# Patient Record
Sex: MALE | Race: AMERICAN INDIAN OR ALASKA NATIVE | Employment: FULL TIME | URBAN - METROPOLITAN AREA
[De-identification: names, ages, dates, MRNs, and addresses within clinical notes are randomized per-mention and may not be internally consistent; named-entity substitution may affect disease eponyms.]

---

## 2017-04-21 ENCOUNTER — OFFICE VISIT (OUTPATIENT)
Dept: INTERNAL MEDICINE CLINIC | Age: 52
End: 2017-04-21

## 2017-04-21 VITALS
SYSTOLIC BLOOD PRESSURE: 130 MMHG | TEMPERATURE: 98.5 F | DIASTOLIC BLOOD PRESSURE: 80 MMHG | RESPIRATION RATE: 18 BRPM | BODY MASS INDEX: 23.52 KG/M2 | HEART RATE: 86 BPM | WEIGHT: 183.25 LBS | HEIGHT: 74 IN | OXYGEN SATURATION: 98 %

## 2017-04-21 DIAGNOSIS — A69.20 ECM (ERYTHEMA CHRONICUM MIGRANS): Primary | ICD-10-CM

## 2017-04-21 RX ORDER — DOXYCYCLINE 100 MG/1
100 TABLET ORAL 2 TIMES DAILY
Qty: 20 TAB | Refills: 0 | Status: SHIPPED | OUTPATIENT
Start: 2017-04-21 | End: 2017-12-21 | Stop reason: ALTCHOICE

## 2017-04-21 NOTE — PROGRESS NOTES
HISTORY OF PRESENT ILLNESS  Daniel Jaramillo is a 47 y.o. male. HPI  Problem visit:  Daniel Jaramillo is here for complaint of insect bite. Problem began 3 week(s) ago. Severity is mild  Character of problem: round, red rash behind R knee, improving gradually, not pruritic. Has central increased redness. nothing makes the problem worse. nothing makes the problem better. Associated symptoms include: no witnessed insect/ tick,   Treatments tried include: emollient lotion helping some      ROS    Physical Exam   Musculoskeletal:        Legs:  R popliteal area - Slightly rounded but irregular faint erythematous patch - 4 cm at widest with suggestion of irregular central clearing. 3-4 scattered darker based vesicles centrally. No obvious puncture. No FB's attached. No swelling. Visit Vitals    /80 (BP 1 Location: Left arm, BP Patient Position: Sitting)    Pulse 86    Temp 98.5 °F (36.9 °C) (Oral)    Resp 18    Ht 6' 1.5\" (1.867 m)    Wt 183 lb 4 oz (83.1 kg)    SpO2 98%    BMI 23.85 kg/m2         ASSESSMENT and PLAN  Ara Kim was seen today for insect bite. Diagnoses and all orders for this visit:    ECM (erythema chronicum migrans) - vs tinea corporis? Can't exclude early Lyme. Will treat empirically for 10 days with doxycycline. Daniel Jaramillo was counseled on this new medication prescribed. Adverse effects, risks and monitoring of medication along with potential benefits of medication were discussed. All of his questions about the medication were answered. Will also cover tinea with lamisil or lotrimin cream bid .  -     doxycycline (ADOXA) 100 mg tablet; Take 1 Tab by mouth two (2) times a day. Follow-up Disposition:  Return if symptoms worsen or fail to improve.

## 2017-04-21 NOTE — MR AVS SNAPSHOT
Visit Information Date & Time Provider Department Dept. Phone Encounter #  
 4/21/2017  4:00 PM Renetta Trejo MD [facility] [address] [id number] Follow-up Instructions Return if symptoms worsen or fail to improve. Upcoming Health Maintenance Date Due INFLUENZA AGE 9 TO ADULT 8/1/2016 COLONOSCOPY 11/9/2020 DTaP/Tdap/Td series (2 - Td) 12/20/2026 Allergies as of 4/21/2017  Review Complete On: 4/21/2017 By: La Nena Anderson LPN Severity Noted Reaction Type Reactions Codeine  08/23/2010   Side Effect Other (comments) Had halluanations, but no real allergy Current Immunizations  Reviewed on 12/20/2016 Name Date Influenza Vaccine 10/21/2013 Influenza Vaccine PF 9/29/2014 Influenza Vaccine Split 10/29/2012, 10/18/2011, 10/4/2010 TD Vaccine 10/4/2008 Tdap 12/20/2016 Not reviewed this visit You Were Diagnosed With   
  
 Codes Comments ECM (erythema chronicum migrans)    -  Primary ICD-10-CM: D51.94 ICD-9-CM: 088.81 Vitals BP Pulse Temp Resp Height(growth percentile) Weight(growth percentile) 130/80 (BP 1 Location: Left arm, BP Patient Position: Sitting) 86 98.5 °F (36.9 °C) (Oral) 18 6' 1.5\" (1.867 m) 183 lb 4 oz (83.1 kg) SpO2 BMI Smoking Status 98% 23.85 kg/m2 Never Smoker Vitals History BMI and BSA Data Body Mass Index Body Surface Area  
 23.85 kg/m 2 2.08 m 2 Preferred Pharmacy Pharmacy Name Phone [id number] [address], [address] [phone] Your Updated Medication List  
  
   
This list is accurate as of: 4/21/17  4:39 PM.  Always use your most recent med list.  
  
  
  
  
 ALEVE 220 mg tablet Generic drug:  naproxen sodium Take 220 mg by mouth as needed. CLARITIN 10 mg tablet Generic drug:  loratadine Take 10 mg by mouth daily. doxycycline 100 mg tablet Commonly known as:  ADOXA Take 1 Tab by mouth two (2) times a day. FISH OIL 1,000 mg Cap Generic drug:  omega-3 fatty acids-vitamin e Take 1 Cap by mouth. fluticasone 50 mcg/actuation nasal spray Commonly known as:  Cherelle Gutierrez INHALE 2 SPRAYS IN EACH NOSTRIL ONCE DAILY AS DIRECTED  
  
 multivitamin, stress formula tablet Commonly known as:  STRESS TAB Take 1 Tab by mouth daily. terbinafine HCl 1 % topical cream  
Commonly known as:  LAMISIL Apply  to affected area two (2) times a day. Prescriptions Sent to Pharmacy Refills  
 doxycycline (ADOXA) 100 mg tablet 0 Sig: Take 1 Tab by mouth two (2) times a day. Class: Normal  
 Pharmacy: [address] [place] [address] Ph #: [phone] Route: Oral  
  
Follow-up Instructions Return if symptoms worsen or fail to improve. Introducing [facility] & HEALTH SERVICES! [place] [facility] introduces [employer] patient portal. Now you can access parts of your medical record, email your doctor's office, and request medication refills online. 1. In your internet browser, go to [email or web address] 2. Click on the First Time User? Click Here link in the Sign In box. You will see the New Member Sign Up page. 3. Enter your Voter [employer]ity Access Code exactly as it appears below. You will not need to use this code after youve completed the sign-up process. If you do not sign up before the expiration date, you must request a new code. · V[employer] Access Code: [email or web address] Expires: 7/20/2017  4:39 PM 
 
4. Enter the last four digits of your Social Security Number (xxxx) and Date of Birth (mm/dd/yyyy) as indicated and click Submit. You will be taken to the next sign-up page. 5. Create a Beacon Powert ID. This will be your Beacon Pow[employer] login ID and cannot be changed, so think of one that is secure and easy to remember. 6. Create a Be[employer] Pow[employer] password. You can change your password at any time. 7. Enter your Password Reset Question and Answer. This can be used at a later time if you forget your password. 8. Enter your e-mail address. You will receive e-mail notification when new information is available in [address] 9. Click Sign Up. You can now view and download portions of your medical record. 10. Click the Download Summary menu link to download a portable copy of your medical information. If you have questions, please visit the Frequently Asked Questions section of the [employer] website. Remember, [employer] is NOT to be used for urgent needs. For medical emergencies, dial 911. Now available from your iPhone and Android! Please provide this summary of care documentation to your next provider. Your primary care clinician is listed as Raghu Maradiaga. If you have any questions after today's visit, please call [phone].

## 2017-12-21 ENCOUNTER — OFFICE VISIT (OUTPATIENT)
Dept: INTERNAL MEDICINE CLINIC | Age: 52
End: 2017-12-21

## 2017-12-21 VITALS
SYSTOLIC BLOOD PRESSURE: 134 MMHG | RESPIRATION RATE: 18 BRPM | DIASTOLIC BLOOD PRESSURE: 77 MMHG | WEIGHT: 185 LBS | BODY MASS INDEX: 23.74 KG/M2 | HEART RATE: 64 BPM | OXYGEN SATURATION: 100 % | TEMPERATURE: 98.3 F | HEIGHT: 74 IN

## 2017-12-21 DIAGNOSIS — Z00.00 PREVENTATIVE HEALTH CARE: Primary | ICD-10-CM

## 2017-12-21 DIAGNOSIS — M25.562 CHRONIC PAIN OF LEFT KNEE: ICD-10-CM

## 2017-12-21 DIAGNOSIS — G89.29 CHRONIC PAIN OF LEFT KNEE: ICD-10-CM

## 2017-12-21 NOTE — MR AVS SNAPSHOT
Visit Information Date & Time Provider Department Dept. Phone Encounter #  
 12/21/2017 10:15 AM Padma Luque MD [facility] [address] [id number] Follow-up Instructions Return in about 1 year (around 12/21/2018). Upcoming Health Maintenance Date Due Influenza Age 5 to Adult 8/1/2017 COLONOSCOPY 11/9/2020 DTaP/Tdap/Td series (2 - Td) 12/20/2026 Allergies as of 12/21/2017  Review Complete On: 12/21/2017 By: Padma Luque MD  
  
 Severity Noted Reaction Type Reactions Codeine  08/23/2010   Side Effect Other (comments) Had halluanations, but no real allergy Current Immunizations  Reviewed on 12/21/2017 Name Date Influenza Vaccine 11/29/2017, 10/21/2013 Influenza Vaccine PF 9/29/2014 Influenza Vaccine Split 10/29/2012, 10/18/2011, 10/4/2010 TD Vaccine 10/4/2008 Tdap 12/20/2016 Reviewed by Padma Luque MD on 12/21/2017 at 10:48 AM  
You Were Diagnosed With   
  
 Codes Comments Preventative health care    -  Primary ICD-10-CM: Z00.00 ICD-9-CM: V70.0 Chronic pain of left knee     ICD-10-CM: M25.562, G89.29 ICD-9-CM: 719.46, 338.29 Vitals BP Pulse Temp Resp Height(growth percentile) Weight(growth percentile) 134/77 (BP 1 Location: Left arm, BP Patient Position: Sitting) 64 98.3 °F (36.8 °C) (Oral) 18 6' 1.5\" (1.867 m) 185 lb (83.9 kg) SpO2 BMI Smoking Status 100% 24.08 kg/m2 Never Smoker Vitals History BMI and BSA Data Body Mass Index Body Surface Area 24.08 kg/m 2 2.09 m 2 Preferred Pharmacy Pharmacy Name Phone [id number] [phone] [facility], [address] [phone] Your Updated Medication List  
  
   
This list is accurate as of: 12/21/17 10:59 AM.  Always use your most recent med list.  
  
  
  
  
 ALEVE 220 mg tablet Generic drug:  naproxen sodium Take 220 mg by mouth as needed. CLARITIN 10 mg tablet Generic drug:  loratadine Take 10 mg by mouth daily. FISH OIL 1,000 mg Cap Generic drug:  omega-3 fatty acids-vitamin e Take 1 Cap by mouth. fluticasone 50 mcg/actuation nasal spray Commonly known as:  Amanda yer INHALE 2 SPRAYS IN EACH NOSTRIL ONCE DAILY AS DIRECTED  
  
 multivitamin, stress formula tablet Commonly known as:  STRESS TAB Take 1 Tab by mouth daily. We Performed the Following LIPID PANEL [57967 CPT(R)] METABOLIC PANEL, BASIC [95220 CPT(R)] PSA, DIAGNOSTIC (PROSTATE SPECIFIC AG) [id number] CPT(R)] Follow-up Instructions Return in about 1 year (around 12/21/2018). Patient Instructions Well Visit, Men 48 to 72: Care Instructions Your Care Instructions Physical exams can help you stay healthy. Your doctor has checked your overall health and may have suggested ways to take good care of yourself. He or she also may have recommended tests. At home, you can help prevent illness with healthy eating, regular exercise, and other steps. Follow-up care is a key part of your treatment and safety. Be sure to make and go to all appointments, and call your doctor if you are having problems. It's also a good idea to know your test results and keep a list of the medicines you take. How can you care for yourself at home? · Reach and stay at a healthy weight. This will lower your risk for many problems, such as obesity, diabetes, heart disease, and high blood pressure. · Get at least 30 minutes of exercise on most days of the week. Walking is a good choice. You also may want to do other activities, such as running, swimming, cycling, or playing tennis or team sports. · Do not smoke. Smoking can make health problems worse. If you need help quitting, talk to your doctor about stop-smoking programs and medicines. These can increase your chances of quitting for good. · Protect your skin from too much sun. When you're outdoors from 10 a.m. to 4 p.m., stay in the shade or cover up with clothing and a hat with a wide brim. Wear sunglasses that block UV rays. Even when it's cloudy, put broad-spectrum sunscreen (SPF 30 or higher) on any exposed skin. · See a dentist one or two times a year for checkups and to have your teeth cleaned. · Wear a seat belt in the car. · Limit alcohol to 2 drinks a day. Too much alcohol can cause health problems. Follow your doctor's advice about when to have certain tests. These tests can spot problems early. · Cholesterol. Your doctor will tell you how often to have this done based on your overall health and other things that can increase your risk for heart attack and stroke. · Blood pressure. Have your blood pressure checked during a routine doctor visit. Your doctor will tell you how often to check your blood pressure based on your age, your blood pressure results, and other factors. · Prostate exam. Talk to your doctor about whether you should have a blood test (called a PSA test) for prostate cancer. Experts disagree on whether men should have this test. Some experts recommend that you discuss the benefits and risks of the test with your doctor. · Diabetes. Ask your doctor whether you should have tests for diabetes. · Vision. Some experts recommend that you have yearly exams for glaucoma and other age-related eye problems starting at age 48. · Hearing. Tell your doctor if you notice any change in your hearing. You can have tests to find out how well you hear. · Colon cancer. You should begin tests for colon cancer at age 48. You may have one of several tests. Your doctor will tell you how often to have tests based on your age and risk. Risks include whether you already had a precancerous polyp removed from your colon or whether your parent, brother, sister, or child has had colon cancer. · Heart attack and stroke risk. At least every 4 to 6 years, you should have your risk for heart attack and stroke assessed. Your doctor uses factors such as your age, blood pressure, cholesterol, and whether you smoke or have diabetes to show what your risk for a heart attack or stroke is over the next 10 years. · Abdominal aortic aneurysm. Ask your doctor whether you should have a test to check for an aneurysm. You may need a test if you ever smoked or if your parent, brother, sister, or child has had an aneurysm. When should you call for help? Watch closely for changes in your health, and be sure to contact your doctor if you have any problems or symptoms that concern you. Where can you learn more? Go to http://anya-brad.info/. Enter Y120 in the search box to learn more about \"Well Visit, Men 48 to 72: Care Instructions. \" Current as of: May 12, 2017 Content Version: 11.4 © 9631-7336 [employer]. Care instructions adapted under license by [employer] (which disclaims liability or warranty for this information). If you have questions about a medical condition or this instruction, always ask your healthcare professional. [place] [address] any warranty or liability for your use of this information. Introducing hospitals & HEALTH SERVICES! [facility] introduces [employer]X patient portal. Now you can access parts of your medical record, email your doctor's office, and request medication refills online. 1. In your internet browser, go to [email or web address]://[email or web address] Interactive Experience. [employer]abe[employer] Software/Fitness Interactive Experience 2. Click on the First Time User? Click Here link in the Sign In box. You will see the New Member Sign Up page. 3. Enter your PanX Access Code exactly as it appears below. You will not need to use this code after youve completed the sign-up process. If you do not sign up before the expiration date, you must request a new code. · PanX Access Code: [email or web address] Expires: 3/21/2018 10:47 AM 
 
4. Enter the last four digits of your Social Security Number (xxxx) and Date of Birth (mm/dd/yyyy) as indicated and click Submit. You will be taken to the next sign-up page. 5. Create a Pinnacle Engines ID. This will be your [employer] login ID and cannot be changed, so think of one that is secure and easy to remember. 6. Create a P[employer] password. You can change your password at any time. 7. Enter your Password Reset Question and Answer. This can be used at a later time if you forget your password. 8. Enter your e-mail address. You will receive e-mail notification when new information is available in [address] 9. Click Sign Up. You can now view and download portions of your medical record. 10. Click the Download Summary menu link to download a portable copy of your medical information. If you have questions, please visit the Frequently Asked Questions section of the [employer] website. Remember, [employer] is NOT to be used for urgent needs. For medical emergencies, dial 911. Now available from your iPhone and Android! Please provide this summary of care documentation to your next provider. Your primary care clinician is listed as Marshall Williamson. If you have any questions after today's visit, please call [phone].

## 2017-12-21 NOTE — PROGRESS NOTES
HISTORY OF PRESENT ILLNESS  Aniket Seals is a 47 y.o. male. HPI  Aniket Seals is here for complete health maintenance physical exam and screening. he does have other concerns. L lateral knee pain with initial exercise/ stair climbing prior to stretching, warming up. Knee problem:  Aniket Seals presents with left knee pain with gradual onset 2 month(s) ago. Injury: No.  Location of discomfort: lateral.  Collapse? -no  Locking? - no  Swelling? no  Warmth ?no  he does not have history of osteoarthritis. Prior imaging of knee? No.      Health maintenance hx includes:  Exercise: very active. Form of exercise: biking, lift, skating daily   Diet: generally follows a low fat low cholesterol diet    Cancer screening:    Colon cancer screening:  Last Colonoscopy: 2015 and was abnormal: TA polyp   Prostate cancer screening: PSA and/or SY:   Lab Results   Component Value Date/Time    Prostate Specific Ag 2.1 12/20/2016 11:21 AM    Prostate Specific Ag 2.1 12/15/2015 09:01 AM    Prostate Specific Ag 1.4 12/08/2014 10:35 AM          Lab Results   Component Value Date/Time    Cholesterol, total 186 12/20/2016 11:21 AM    HDL Cholesterol 85 12/20/2016 11:21 AM    LDL, calculated 91 12/20/2016 11:21 AM    VLDL, calculated 10 12/20/2016 11:21 AM    Triglyceride 52 12/20/2016 11:21 AM       Lab Results   Component Value Date/Time    Glucose 94 12/20/2016 11:21 AM         Immunizations:     Immunization History   Administered Date(s) Administered    Influenza Vaccine 10/21/2013, 11/29/2017    Influenza Vaccine PF 09/29/2014    Influenza Vaccine Split 10/04/2010, 10/18/2011, 10/29/2012    TD Vaccine 10/04/2008    Tdap 12/20/2016      Immunization status: up to date and documented. Social History     Social History    Marital status:      Spouse name: N/A    Number of children: N/A    Years of education: N/A     Occupational History    Not on file.      Social History Main Topics    Smoking status: Never Smoker    Smokeless tobacco: Never Used    Alcohol use 5.0 oz/week     10 Cans of beer per week      Comment: Social     Drug use: No    Sexual activity: Yes     Partners: Female     Other Topics Concern    Not on file     Social History Narrative     Past Surgical History:   Procedure Laterality Date    HX HERNIA REPAIR      bilateral    KNEE SCOPE,AID ANT CRUCIATE REPAIR      Right revision- Magalis Cole     Family History   Problem Relation Age of Onset   [facility] Cancer Mother      lung    Arthritis-rheumatoid Mother     Arthritis-rheumatoid Sister     COPD Father     Diabetes Neg Hx     Hypertension Neg Hx     Heart Disease Neg Hx     Elevated Lipids Neg Hx     Thyroid Disease Neg Hx      Current Outpatient Prescriptions on File Prior to Visit   Medication Sig Dispense Refill    naproxen sodium (ALEVE) 220 mg tablet Take 220 mg by mouth as needed.  fluticasone (FLONASE) 50 mcg/actuation nasal spray INHALE 2 SPRAYS IN EACH NOSTRIL ONCE DAILY AS DIRECTED 1 Bottle 3    multivitamin, stress formula (STRESS TAB) tablet Take 1 Tab by mouth daily.  omega-3 fatty acids-vitamin e (FISH OIL) 1,000 mg Cap Take 1 Cap by mouth.  loratadine (CLARITIN) 10 mg tablet Take 10 mg by mouth daily. No current facility-administered medications on file prior to visit. .    Review of Systems   Constitutional: Negative for malaise/fatigue and weight loss. HENT: Negative for sore throat. Eyes: Negative for blurred vision and pain. Respiratory: Negative for cough, shortness of breath and wheezing. Cardiovascular: Negative for chest pain, palpitations and leg swelling. Gastrointestinal: Negative for constipation, diarrhea, heartburn, nausea and vomiting. Genitourinary: Negative for dysuria and hematuria. Musculoskeletal: Positive for joint pain. Negative for back pain and myalgias. Skin: Negative for rash. Neurological: Negative for dizziness and headaches. Psychiatric/Behavioral: Negative for depression. The patient is not nervous/anxious. Physical Exam   Constitutional: He is oriented to person, place, and time. He appears well-developed and well-nourished. No distress. Body mass index is 24.08 kg/(m^2). /77 (BP 1 Location: Left arm, BP Patient Position: Sitting)  Pulse 64  Temp 98.3 °F (36.8 °C) (Oral)   Resp 18  Ht 6' 1.5\" (1.867 m)  Wt 185 lb (83.9 kg)  SpO2 100%  BMI 24.08 kg/m2   HENT:   Head: Normocephalic and atraumatic. Right Ear: Hearing, tympanic membrane and ear canal normal.   Left Ear: Hearing, tympanic membrane and ear canal normal.   Nose: Nose normal.   Mouth/Throat: Oropharynx is clear and moist and mucous membranes are normal. Normal dentition. Eyes: Conjunctivae and lids are normal. Pupils are equal, round, and reactive to light. Right eye exhibits no discharge. Left eye exhibits no discharge. No scleral icterus. Neck: Trachea normal. No thyromegaly present. Cardiovascular: Normal rate, regular rhythm, normal heart sounds, intact distal pulses and normal pulses. Exam reveals no gallop and no friction rub. No murmur heard. Pulmonary/Chest: Effort normal and breath sounds normal. No respiratory distress. Abdominal: Soft. Normal appearance and bowel sounds are normal. He exhibits no distension and no mass. There is no hepatosplenomegaly. There is no tenderness. There is no CVA tenderness. Musculoskeletal: Normal range of motion. He exhibits no edema or tenderness. left knee exam:    ROM: normal flexion to 20 deg  Effusion: absent  Warmth:  absent  Crepitus: trace    Varsha test:  Negative  Stability:  Lachman:  negative  Posterior Drawer:  negative  MCL:  normal  LCL:  normal  Joint line tenderness:  NO    Patellar tendon tenderness:  NO  Popliteal mass: NO     Lymphadenopathy:     He has no cervical adenopathy. Right: No inguinal adenopathy present. Left: No inguinal adenopathy present. Neurological: He is alert and oriented to person, place, and time. Skin: Skin is warm and dry. No rash noted. He is not diaphoretic. Psychiatric: He has a normal mood and affect. His speech is normal and behavior is normal. Judgment and thought content normal. Cognition and memory are normal.   Nursing note and vitals reviewed. ASSESSMENT and PLAN  Diagnoses and all orders for this visit:    1. Preventative health care  -     LIPID PANEL  -     PROSTATE SPECIFIC AG  -     METABOLIC PANEL, BASIC  he was advised to have follow up colonoscopy in [address] was counseled on age-appropriate/ guideline-based risk prevention behaviors and screening for a 47y.o. year old   male . We also discussed adjustments in screening based on family history if necessary. Printed instructions for preventative screening guidelines and healthy behaviors given to patient with after visit summary. 2. Chronic pain of left knee - ? Joint strain, mild PF syndrome but not quite typical for that -no \"theater knee\" pain. May present to his knee orthopod Dr. Sagar Harris if ongoing. He declines xray today. Follow-up Disposition:  Return in about 1 year (around 12/21/2018).

## 2017-12-21 NOTE — PATIENT INSTRUCTIONS

## 2017-12-22 LAB
BUN SERPL-MCNC: 14 MG/DL (ref 6–24)
BUN/CREAT SERPL: 19 (ref 9–20)
CALCIUM SERPL-MCNC: 9.6 MG/DL (ref 8.7–10.2)
CHLORIDE SERPL-SCNC: 104 MMOL/L (ref 96–106)
CHOLEST SERPL-MCNC: 185 MG/DL (ref 100–199)
CO2 SERPL-SCNC: 26 MMOL/L (ref 18–29)
CREAT SERPL-MCNC: 0.72 MG/DL (ref 0.76–1.27)
GLUCOSE SERPL-MCNC: 91 MG/DL (ref 65–99)
HDLC SERPL-MCNC: 81 MG/DL
INTERPRETATION, 910389: NORMAL
LDLC SERPL CALC-MCNC: 95 MG/DL (ref 0–99)
POTASSIUM SERPL-SCNC: 4.7 MMOL/L (ref 3.5–5.2)
PSA SERPL-MCNC: 2.3 NG/ML (ref 0–4)
SODIUM SERPL-SCNC: 143 MMOL/L (ref 134–144)
TRIGL SERPL-MCNC: 47 MG/DL (ref 0–149)
VLDLC SERPL CALC-MCNC: 9 MG/DL (ref 5–40)

## 2018-12-03 ENCOUNTER — HOSPITAL ENCOUNTER (EMERGENCY)
Age: 53
Discharge: HOME | End: 2018-12-03
Attending: STUDENT IN AN ORGANIZED HEALTH CARE EDUCATION/TRAINING PROGRAM | Admitting: STUDENT IN AN ORGANIZED HEALTH CARE EDUCATION/TRAINING PROGRAM
Payer: COMMERCIAL

## 2018-12-03 ENCOUNTER — APPOINTMENT (OUTPATIENT)
Dept: GENERAL RADIOLOGY | Age: 53
End: 2018-12-03
Attending: STUDENT IN AN ORGANIZED HEALTH CARE EDUCATION/TRAINING PROGRAM
Payer: COMMERCIAL

## 2018-12-03 VITALS
OXYGEN SATURATION: 100 % | BODY MASS INDEX: 23.1 KG/M2 | WEIGHT: 180 LBS | RESPIRATION RATE: 14 BRPM | HEIGHT: 74 IN | DIASTOLIC BLOOD PRESSURE: 75 MMHG | HEART RATE: 75 BPM | TEMPERATURE: 97.3 F | SYSTOLIC BLOOD PRESSURE: 119 MMHG

## 2018-12-03 DIAGNOSIS — S61.213A LACERATION OF LEFT MIDDLE FINGER WITHOUT FOREIGN BODY WITHOUT DAMAGE TO NAIL, INITIAL ENCOUNTER: Primary | ICD-10-CM

## 2018-12-03 PROCEDURE — 96372 THER/PROPH/DIAG INJ SC/IM: CPT

## 2018-12-03 PROCEDURE — 74011250636 HC RX REV CODE- 250/636: Performed by: PHYSICIAN ASSISTANT

## 2018-12-03 PROCEDURE — 75810000293 HC SIMP/SUPERF WND  RPR

## 2018-12-03 PROCEDURE — 74011000250 HC RX REV CODE- 250: Performed by: PHYSICIAN ASSISTANT

## 2018-12-03 PROCEDURE — 77030008326 HC SPLNT FNGR PLSTL DERY -A

## 2018-12-03 PROCEDURE — 99283 EMERGENCY DEPT VISIT LOW MDM: CPT

## 2018-12-03 PROCEDURE — 74011250637 HC RX REV CODE- 250/637: Performed by: PHYSICIAN ASSISTANT

## 2018-12-03 PROCEDURE — 77030002916 HC SUT ETHLN J&J -A

## 2018-12-03 PROCEDURE — 73140 X-RAY EXAM OF FINGER(S): CPT

## 2018-12-03 PROCEDURE — 77030018836 HC SOL IRR NACL ICUM -A

## 2018-12-03 RX ORDER — BACITRACIN 500 UNIT/G
PACKET (EA) TOPICAL
Status: DISCONTINUED
Start: 2018-12-03 | End: 2018-12-03 | Stop reason: HOSPADM

## 2018-12-03 RX ORDER — WATER FOR INJECTION,STERILE
VIAL (ML) INJECTION
Status: DISCONTINUED
Start: 2018-12-03 | End: 2018-12-03 | Stop reason: HOSPADM

## 2018-12-03 RX ORDER — OXYCODONE AND ACETAMINOPHEN 5; 325 MG/1; MG/1
1 TABLET ORAL
Status: COMPLETED | OUTPATIENT
Start: 2018-12-03 | End: 2018-12-03

## 2018-12-03 RX ORDER — CEFAZOLIN SODIUM 1 G/3ML
1 INJECTION, POWDER, FOR SOLUTION INTRAMUSCULAR; INTRAVENOUS
Status: COMPLETED | OUTPATIENT
Start: 2018-12-03 | End: 2018-12-03

## 2018-12-03 RX ORDER — LIDOCAINE HYDROCHLORIDE 10 MG/ML
10 INJECTION, SOLUTION EPIDURAL; INFILTRATION; INTRACAUDAL; PERINEURAL ONCE
Status: COMPLETED | OUTPATIENT
Start: 2018-12-03 | End: 2018-12-03

## 2018-12-03 RX ORDER — CEPHALEXIN 500 MG/1
500 CAPSULE ORAL 3 TIMES DAILY
Qty: 21 CAP | Refills: 0 | Status: SHIPPED | OUTPATIENT
Start: 2018-12-03 | End: 2018-12-10

## 2018-12-03 RX ORDER — CEFAZOLIN SODIUM 1 G/3ML
INJECTION, POWDER, FOR SOLUTION INTRAMUSCULAR; INTRAVENOUS
Status: DISCONTINUED
Start: 2018-12-03 | End: 2018-12-03 | Stop reason: HOSPADM

## 2018-12-03 RX ORDER — BACITRACIN 500 UNIT/G
1 PACKET (EA) TOPICAL
Status: COMPLETED | OUTPATIENT
Start: 2018-12-03 | End: 2018-12-03

## 2018-12-03 RX ORDER — OXYCODONE AND ACETAMINOPHEN 5; 325 MG/1; MG/1
1 TABLET ORAL
Qty: 20 TAB | Refills: 0 | Status: SHIPPED | OUTPATIENT
Start: 2018-12-03 | End: 2019-03-18

## 2018-12-03 RX ADMIN — BACITRACIN 1 PACKET: 500 OINTMENT TOPICAL at 18:22

## 2018-12-03 RX ADMIN — CEFAZOLIN SODIUM 1 G: 1 INJECTION, POWDER, FOR SOLUTION INTRAMUSCULAR; INTRAVENOUS at 18:23

## 2018-12-03 RX ADMIN — LIDOCAINE HYDROCHLORIDE 10 ML: 10 INJECTION, SOLUTION EPIDURAL; INFILTRATION; INTRACAUDAL; PERINEURAL at 16:54

## 2018-12-03 RX ADMIN — OXYCODONE HYDROCHLORIDE AND ACETAMINOPHEN 1 TABLET: 5; 325 TABLET ORAL at 16:53

## 2018-12-03 NOTE — ED PROVIDER NOTES
46 y.o. right-hand dominant male with past medical history significant for ankle sprain who presents from home with chief complaint of laceration. The patient states that approx 40 minutes prior to arrival he was sitting on a swivel chair at work when the chair collapsed and his left middle finger was crushed between two parts of the chair. His pain an 8/10, throbbing, non-radiating. Last tetanus vaccine was in 2016. He endorses swelling of his distal finger but denies weakness, numbness, or any other injuries. There are no other acute medical concerns at this time. PCP: Andrew Ragland MD 
 
Note written by Lina Grimm, as dictated by Jamel Kaur MD 3:36 PM 
 
 
 
The history is provided by the patient. Past Medical History:  
Diagnosis Date  Ankle sprain 04/2016 Left Lateral Sprain Past Surgical History:  
Procedure Laterality Date  HX HERNIA REPAIR    
 bilateral  
 KNEE SCOPE,AID ANT CRUCIATE REPAIR Right revision- Meme Dunn Family History:  
Problem Relation Age of Onset  Cancer Mother   
     lung  Arthritis-rheumatoid Mother [employer] Arthritis-rheumatoid Sister  COPD Father  Diabetes Neg Hx  Hypertension Neg Hx   
 Heart Disease Neg Hx  Elevated Lipids Neg Hx  Thyroid Disease Neg Hx Social History Socioeconomic History  Marital status:  Spouse name: Not on file  Number of children: Not on file  Years of education: Not on file  Highest education level: Not on file Social Needs  Financial resource strain: Not on file  Food insecurity - worry: Not on file  Food insecurity - inability: Not on file  Transportation needs - medical: Not on file  Transportation needs - non-medical: Not on file Occupational History  Not on file Tobacco Use  Smoking status: Never Smoker  Smokeless tobacco: Never Used Substance and Sexual Activity  Alcohol use:  Yes  
 Alcohol/week: 5.0 oz Types: 10 Cans of beer per week Comment: Social   
 Drug use: No  
 Sexual activity: Yes  
  Partners: Female Other Topics Concern  Not on file Social History Narrative  Not on file ALLERGIES: Codeine Review of Systems Constitutional: Negative for chills, diaphoresis, fatigue and fever. HENT: Negative for congestion, rhinorrhea, sinus pressure, sore throat, trouble swallowing and voice change. Eyes: Negative for photophobia and visual disturbance. Respiratory: Negative for cough, chest tightness and shortness of breath. Cardiovascular: Negative for chest pain, palpitations and leg swelling. Gastrointestinal: Negative for abdominal pain, blood in stool, constipation, diarrhea, nausea and vomiting. Musculoskeletal: Negative for arthralgias, myalgias and neck pain. Skin: Positive for wound. Neurological: Negative for dizziness, weakness, light-headedness, numbness and headaches. All other systems reviewed and are negative. Vitals:  
 12/03/18 1536 BP: 119/75 Pulse: 75 Resp: 14 Temp: 97.3 °F (36.3 °C) SpO2: 100% Weight: 81.6 kg (180 lb) Height: 6' 2\" (1.88 m) Physical Exam  
Constitutional: He is oriented to person, place, and time. He appears well-developed and well-nourished. No distress. HENT:  
Head: Normocephalic and atraumatic. Eyes: Conjunctivae and EOM are normal.  
Neck: Normal range of motion. Neck supple. Cardiovascular: Normal rate. Pulmonary/Chest: Effort normal. No respiratory distress. Musculoskeletal:  
Left middle finger with laceration of volar aspect of distal phalanx extending from radial edge of finger to ulnar edge of finger, approx 2 cm long, not involving nail. Subungual hematoma noted. Distal phalanx with swelling and erythema distal to wound, light touch sensation intact. Neurological: He is alert and oriented to person, place, and time. Skin: Skin is warm and dry. He is not diaphoretic. Nursing note and vitals reviewed. MDM Number of Diagnoses or Management Options Laceration of left middle finger without foreign body without damage to nail, initial encounter:  
  
Amount and/or Complexity of Data Reviewed Tests in the radiology section of CPT®: ordered and reviewed Discuss the patient with other providers: yes (Dr. Juan Fuller, ED attending) Independent visualization of images, tracings, or specimens: yes (XR left middle finger) Patient Progress Patient progress: stable Nerve Block Date/Time: 12/3/2018 5:07 PM 
Performed by: FARHAT Marx Authorized by: FARHAT Marx Consent:  
  Consent obtained:  Verbal 
  Consent given by:  Patient Indications:  
  Indications:  Pain relief and procedural anesthesia Location:  
  Body area:  Upper extremity Upper extremity nerve blocked: digital block. Laterality:  Left Pre-procedure details:  
  Skin preparation:  Povidone-iodine Procedure details (see MAR for exact dosages): Block needle gauge:  25 G Anesthetic injected:  Lidocaine 1% w/o epi Steroid injected:  None Additive injected:  None Injection procedure:  Anatomic landmarks identified, anatomic landmarks palpated, introduced needle, negative aspiration for blood and incremental injection Post-procedure details:  
  Dressing:  None Outcome:  Anesthesia achieved Patient tolerance of procedure: Tolerated well, no immediate complications Wound Repair 
Date/Time: 12/4/2018 1:12 AM 
Preparation: skin prepped with Betadine Location details: left long finger Wound length:2.5 cm or less Anesthesia: digital block (see procedure note above) Foreign bodies: no foreign bodies Irrigation solution: saline Irrigation method: syringe Debridement: none Skin closure: 4-0 nylon Number of sutures: 6 Technique: simple Approximation: loose Dressing: antibiotic ointment Patient tolerance: Patient tolerated the procedure well with no immediate complications My total time at bedside, performing this procedure was 16-30 minutes. 46 y.o. right-hand dominant male with past medical history significant for ankle sprain who presents from home with chief complaint of laceration. Crush injury to distal phalanx of left middle finger, no neurovascular compromise. XR left middle finger, read by radiology and independently visualized and interpreted by myself, reveals minimally displaced comminuted tuft fracture. Digital block and laceration repair performed, see procedure notes above for details. Patient given Ancef 1g IM here, Rx for Keflex at home. Recommended ortho hand follow up in the next 1-3 days. Strict ED return precautions discussed and provided in writing at time of discharge. The patient verbalized understanding and agreement with this plan.

## 2018-12-03 NOTE — ED NOTES
Bulky dressing applied over splint. PVS intact. Patient and spouse instructed on care of splint and dressing. Verbalized understanding. Discharge instructions/prescriptions if applicable reviewed by FARHAT [place]. Patient ambulatory out of ED with spouse, in NAD.

## 2018-12-03 NOTE — DISCHARGE INSTRUCTIONS
Cuts on the Hand Closed With Stitches: Care Instructions  Your Care Instructions    A cut on your hand can be on your fingers, your thumb, or the front or back of your hand. Sometimes a cut can injure the tendons, blood vessels, or nerves of your hand. The doctor used stitches to close the cut. Using stitches also helps the cut heal and reduces scarring. The doctor may have given you a splint to help prevent you from moving your hand, fingers, or thumb. If the cut went deep and through the skin, the doctor put in two layers of stitches. The deeper layer brings the deep part of the cut together. These stitches will dissolve and don't need to be removed. The stitches in the upper layer are the ones you see on the cut. You will probably have a bandage. You will need to have the stitches removed, usually in 7 to 14 days. The doctor may suggest that you see a hand specialist if the cut is very deep or if you have trouble moving your fingers or have less feeling in your hand. The doctor has checked you carefully, but problems can develop later. If you notice any problems or new symptoms, get medical treatment right away. Follow-up care is a key part of your treatment and safety. Be sure to make and go to all appointments, and call your doctor if you are having problems. It's also a good idea to know your test results and keep a list of the medicines you take. How can you care for yourself at home? · Keep the cut dry for the first 24 to 48 hours. After this, you can shower if your doctor okays it. Pat the cut dry. · Don't soak the cut, such as in a bathtub. Your doctor will tell you when it's safe to get the cut wet. · If your doctor told you how to care for your cut, follow your doctor's instructions. If you did not get instructions, follow this general advice:  ? After the first 24 to 48 hours, wash around the cut with clean water 2 times a day.  Don't use hydrogen peroxide or alcohol, which can slow healing. ? You may cover the cut with a thin layer of petroleum jelly, such as Vaseline, and a nonstick bandage. ? Apply more petroleum jelly and replace the bandage as needed. · Prop up the sore hand on a pillow anytime you sit or lie down during the next 3 days. Try to keep it above the level of your heart. This will help reduce swelling. · Avoid any activity that could cause your cut to reopen. · Do not remove the stitches on your own. Your doctor will tell you when to come back to have the stitches removed. · Be safe with medicines. Take pain medicines exactly as directed. ? If the doctor gave you a prescription medicine for pain, take it as prescribed. ? If you are not taking a prescription pain medicine, ask your doctor if you can take an over-the-counter medicine. When should you call for help? Call your doctor now or seek immediate medical care if:    · You have new pain, or your pain gets worse.     · The skin near the cut is cold or pale or changes color.     · You have tingling, weakness, or numbness near the cut.     · The cut starts to bleed, and blood soaks through the bandage. Oozing small amounts of blood is normal.     · You have trouble moving the area of the hand near the cut.     · You have symptoms of infection, such as:  ? Increased pain, swelling, warmth, or redness around the cut.  ? Red streaks leading from the cut.  ? Pus draining from the cut.  ? A fever.    Watch closely for changes in your health, and be sure to contact your doctor if:    · You do not get better as expected. Where can you learn more? Go to http://anya-brad.info/. Enter T250 in the search box to learn more about \"Cuts on the Hand Closed With Stitches: Care Instructions. \"  Current as of: November 20, 2017  Content Version: 11.8  © 3290-3246 [employer], [employer].  Care instructions adapted under license by [employer] (which disclaims liability or warranty for this information). If you have questions about a medical condition or this instruction, always ask your healthcare professional. [place] [address] any warranty or liability for your use of this information.

## 2019-03-07 ENCOUNTER — OFFICE VISIT (OUTPATIENT)
Dept: INTERNAL MEDICINE CLINIC | Age: 54
End: 2019-03-07

## 2019-03-07 VITALS
OXYGEN SATURATION: 100 % | BODY MASS INDEX: 24.15 KG/M2 | RESPIRATION RATE: 18 BRPM | TEMPERATURE: 98.2 F | HEART RATE: 96 BPM | HEIGHT: 74 IN | DIASTOLIC BLOOD PRESSURE: 75 MMHG | SYSTOLIC BLOOD PRESSURE: 125 MMHG | WEIGHT: 188.2 LBS

## 2019-03-07 DIAGNOSIS — R68.89 FLU-LIKE SYMPTOMS: Primary | ICD-10-CM

## 2019-03-07 LAB
QUICKVUE INFLUENZA TEST: NEGATIVE
VALID INTERNAL CONTROL?: YES

## 2019-03-07 RX ORDER — OSELTAMIVIR PHOSPHATE 75 MG/1
75 CAPSULE ORAL 2 TIMES DAILY
Qty: 10 CAP | Refills: 0 | Status: SHIPPED | OUTPATIENT
Start: 2019-03-07 | End: 2019-03-12

## 2019-03-07 RX ORDER — IBUPROFEN 200 MG
TABLET ORAL
COMMUNITY

## 2019-03-07 NOTE — PROGRESS NOTES
HISTORY OF PRESENT ILLNESS  Naa Flores is a 53 y.o. male. HPI   Flu-Like Symptoms: Pt started to experience chills on afternoon of 2/05/19. He had loose stools on 2/05/19 and diarrhea 1x last night and 1x this morning at 8am. He had fever of 100.8 degrees last night. He confirms body aches and abdominal soreness. Denies cough or congestion. Pt took Imodium this morning. He notes that wife is currently experiencing upper respiratory symptoms. He was tested negative for flu today in clinic. Review of Systems   Constitutional: Positive for chills and fever. Gastrointestinal: Positive for diarrhea. All other systems reviewed and are negative. Physical Exam   Constitutional: He is oriented to person, place, and time. He appears well-developed and well-nourished. HENT:   Head: Normocephalic and atraumatic. Right Ear: External ear normal.   Left Ear: External ear normal.   Nose: Nose normal.   Mouth/Throat: Oropharynx is clear and moist.   Eyes: Conjunctivae and EOM are normal.   Neck: Normal range of motion. Neck supple. Cardiovascular: Normal rate, regular rhythm, normal heart sounds and intact distal pulses. Pulmonary/Chest: Effort normal and breath sounds normal.   Abdominal: Soft. Bowel sounds are normal.   Genitourinary: Testes normal.   Musculoskeletal: Normal range of motion. Neurological: He is alert and oriented to person, place, and time. Skin: Skin is warm and dry. Psychiatric: He has a normal mood and affect. His behavior is normal. Judgment and thought content normal.   Nursing note and vitals reviewed. ASSESSMENT and PLAN  Diagnoses and all orders for this visit:    1. Flu-like symptoms  Prescribed Tamiflu. Discussed that, if symptoms become unmanageable, pt can start on Tamiflu. Pt was tested negative for flu today in clinic. Will monitor for any changes or improvements. -     oseltamivir (TAMIFLU) 75 mg capsule;  Take 1 Cap by mouth two (2) times a day for 5 days.       Lab results and schedule of future lab studies reviewed with patient. Reviewed diet, exercise and weight control. Written by Ami Leger, as dictated by Roddie Lombard, MD.     Current diagnosis and concerns discussed with pt at length. Understands risks and benefits or current treatment plan and medications and accepts the treatment and medication with any possible risks. Pt asks appropriate questions which were answered. Pt instructed to call with any concerns or problems. This note will not be viewable in [address]

## 2019-03-18 ENCOUNTER — OFFICE VISIT (OUTPATIENT)
Dept: INTERNAL MEDICINE CLINIC | Age: 54
End: 2019-03-18

## 2019-03-18 VITALS
WEIGHT: 185 LBS | HEART RATE: 74 BPM | SYSTOLIC BLOOD PRESSURE: 116 MMHG | RESPIRATION RATE: 16 BRPM | DIASTOLIC BLOOD PRESSURE: 78 MMHG | BODY MASS INDEX: 23.74 KG/M2 | OXYGEN SATURATION: 96 % | TEMPERATURE: 97.8 F | HEIGHT: 74 IN

## 2019-03-18 DIAGNOSIS — Z00.00 WELLNESS EXAMINATION: Primary | ICD-10-CM

## 2019-03-18 DIAGNOSIS — Z12.5 PROSTATE CANCER SCREENING: ICD-10-CM

## 2019-03-18 NOTE — PATIENT INSTRUCTIONS

## 2019-03-18 NOTE — PROGRESS NOTES
Chen Orellana is a 53 y.o. male who presents today for Complete Physical  .      He has a history of   Patient Active Problem List   Diagnosis Code    AR (allergic rhinitis) J30.9    Adenomatous colon polyp D12.6   . Today patient is here for complete physical exam.    Has been working with orthopedic surgeon due to a crush injury to his middle finger. Still notes some discoloration and some neuropathy to the area. Pain is overall controlled    Health maintenance hx includes:  Exercise: moderately active. Form of exercise: Cardio and skating. Enjoyed swimming but has done less of this recently due to finger injury. 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 He has worked and lived in several locations. Lives at home with wife and two children. Cancer screening:    Colon cancer screening:  Last Colonoscopy: 2015 and was abnormal: 5 yr f/u   Prostate cancer screening: PSA and/or SY: 2017 and was normal    Immunizations:     Immunization History   Administered Date(s) Administered    Influenza Vaccine 10/21/2013, 11/29/2017, 11/01/2018    Influenza Vaccine PF 09/29/2014    Influenza Vaccine Split 10/04/2010, 10/18/2011, 10/29/2012    TD Vaccine 10/04/2008    Tdap 12/20/2016      Immunization status: Shingles. .      ROS  Review of Systems   Constitutional: Negative for chills, fever, malaise/fatigue and weight loss. HENT: Negative for congestion, hearing loss, sore throat and tinnitus. Eyes: Negative for blurred vision, double vision and photophobia. Respiratory: Negative for cough and shortness of breath. Cardiovascular: Negative for chest pain, palpitations and leg swelling. Gastrointestinal: Negative for abdominal pain, constipation, diarrhea, heartburn, nausea and vomiting. Genitourinary: Negative for dysuria, frequency and urgency.    Musculoskeletal: Negative for joint pain and myalgias. Skin: Negative for rash. Neurological: Negative. Negative for headaches. Endo/Heme/Allergies: Does not bruise/bleed easily. Psychiatric/Behavioral: Negative for depression, memory loss and suicidal ideas. The patient is not nervous/anxious. Visit Vitals  /78 (BP 1 Location: Left arm, BP Patient Position: Sitting)   Pulse 74   Temp 97.8 °F (36.6 °C) (Oral)   Resp 16   Ht 6' 2\" (1.88 m)   Wt 185 lb (83.9 kg)   SpO2 96%   BMI 23.75 kg/m²       Physical Exam   Constitutional: He is oriented to person, place, and time. He appears well-developed and well-nourished. HENT:   Head: Normocephalic and atraumatic. Eyes: EOM are normal. Pupils are equal, round, and reactive to light. Cardiovascular: Normal rate and regular rhythm. No murmur heard. Pulmonary/Chest: Effort normal and breath sounds normal. No respiratory distress. Musculoskeletal: Normal range of motion. He exhibits no edema. Hands:  Discolored middle finger. Neurological: He is alert and oriented to person, place, and time. Skin: Skin is warm and dry. He is not diaphoretic. Psychiatric: He has a normal mood and affect. His behavior is normal.         Current Outpatient Medications   Medication Sig    ibuprofen (MOTRIN) 200 mg tablet Take  by mouth.  naproxen sodium (ALEVE) 220 mg tablet Take 220 mg by mouth as needed.  multivitamin, stress formula (STRESS TAB) tablet Take 1 Tab by mouth daily.  omega-3 fatty acids-vitamin e (FISH OIL) 1,000 mg Cap Take 1 Cap by mouth.  loratadine (CLARITIN) 10 mg tablet Take 10 mg by mouth daily. No current facility-administered medications for this visit.          Past Medical History:   Diagnosis Date    Ankle sprain 04/2016    Left Lateral Sprain      Past Surgical History:   Procedure Laterality Date    HX HERNIA REPAIR      bilateral    KNEE SCOPE,AID ANT CRUCIATE REPAIR      Right revision- Fátima Chavez      Social History     Tobacco Use    Smoking status: Never Smoker    Smokeless tobacco: Never Used   Substance Use Topics    Alcohol use: Yes     Alcohol/week: 5.0 oz     Types: 10 Cans of beer per week     Comment: Social       Family History   Problem Relation Age of Onset    Cancer Mother         lung    Arthritis-rheumatoid Mother     Arthritis-rheumatoid Sister     COPD Father     Diabetes Neg Hx     Hypertension Neg Hx     Heart Disease Neg Hx     Elevated Lipids Neg Hx     Thyroid Disease Neg Hx         Allergies   Allergen Reactions    Codeine Other (comments)     Had halluanations, but no real allergy        Assessment/Plan  Diagnoses and all orders for this visit:    1. Wellness examination - [facility] was counseled on age-appropriate/ guideline-based risk prevention behaviors and screening for a 53y.o. year old   male . We also discussed adjustments in screening based on family history if necessary. Printed instructions for preventative screening guidelines and healthy behaviors given to patient with after visit summary.    -     METABOLIC PANEL, COMPREHENSIVE  -     LIPID PANEL    2. Prostate cancer screening  -     PSA W/ REFLX FREE PSA        Follow-up Disposition:  Return if symptoms worsen or fail to improve.     Clarice Garcia MD  3/18/2019

## 2019-03-20 ENCOUNTER — TELEPHONE (OUTPATIENT)
Dept: INTERNAL MEDICINE CLINIC | Age: 54
End: 2019-03-20

## 2019-03-20 LAB
ALBUMIN SERPL-MCNC: 4.6 G/DL (ref 3.5–5.5)
ALBUMIN/GLOB SERPL: 1.9 {RATIO} (ref 1.2–2.2)
ALP SERPL-CCNC: 82 IU/L (ref 39–117)
ALT SERPL-CCNC: 30 IU/L (ref 0–44)
AST SERPL-CCNC: 22 IU/L (ref 0–40)
BILIRUB SERPL-MCNC: 0.5 MG/DL (ref 0–1.2)
BUN SERPL-MCNC: 13 MG/DL (ref 6–24)
BUN/CREAT SERPL: 15 (ref 9–20)
CALCIUM SERPL-MCNC: 9.6 MG/DL (ref 8.7–10.2)
CHLORIDE SERPL-SCNC: 104 MMOL/L (ref 96–106)
CHOLEST SERPL-MCNC: 172 MG/DL (ref 100–199)
CO2 SERPL-SCNC: 20 MMOL/L (ref 20–29)
CREAT SERPL-MCNC: 0.89 MG/DL (ref 0.76–1.27)
GLOBULIN SER CALC-MCNC: 2.4 G/DL (ref 1.5–4.5)
GLUCOSE SERPL-MCNC: 97 MG/DL (ref 65–99)
HDLC SERPL-MCNC: 59 MG/DL
INTERPRETATION, 910389: NORMAL
LDLC SERPL CALC-MCNC: 102 MG/DL (ref 0–99)
POTASSIUM SERPL-SCNC: 4.8 MMOL/L (ref 3.5–5.2)
PROT SERPL-MCNC: 7 G/DL (ref 6–8.5)
PSA FREE MFR SERPL: 11.3 %
PSA FREE SERPL-MCNC: 0.54 NG/ML
PSA SERPL-MCNC: 4.8 NG/ML (ref 0–4)
REFLEX CRITERIA: ABNORMAL
SODIUM SERPL-SCNC: 142 MMOL/L (ref 134–144)
TRIGL SERPL-MCNC: 53 MG/DL (ref 0–149)
VLDLC SERPL CALC-MCNC: 11 MG/DL (ref 5–40)

## 2019-03-21 ENCOUNTER — TELEPHONE (OUTPATIENT)
Dept: INTERNAL MEDICINE CLINIC | Age: 54
End: 2019-03-21

## 2019-03-21 DIAGNOSIS — R97.20 ELEVATED PSA: Primary | ICD-10-CM

## 2019-03-21 NOTE — TELEPHONE ENCOUNTER
Discussed results over the phone with patient. He would like to wait 3 months to repeat his PSA. We will mail him the lab slip.

## 2019-06-05 LAB
PSA SERPL-MCNC: 3.9 NG/ML (ref 0–4)
REFLEX CRITERIA: NORMAL

## 2019-06-05 NOTE — PROGRESS NOTES
Called and left a message regarding his PSA. Please make sure that he has received this. Let him know that his PSA is improved and I would like to repeat this in 6 months. Patient to see me in 6 months.

## 2019-06-21 DIAGNOSIS — R97.20 ELEVATED PSA: ICD-10-CM

## 2019-11-14 ENCOUNTER — TELEPHONE (OUTPATIENT)
Dept: INTERNAL MEDICINE CLINIC | Age: 54
End: 2019-11-14

## 2019-11-14 DIAGNOSIS — R97.20 ELEVATED PSA: Primary | ICD-10-CM

## 2019-11-14 NOTE — TELEPHONE ENCOUNTER
----- Message from Jj Ohs sent at 11/14/2019  4:59 PM EST -----  Regarding: Dr. Sara Otoole / Miguel Akins: [phone]  Caller's first and last name:  N/A  Reason for call: Pt. was returning a call in reference to a message for a repeat blood test 6 months from pt.'s last blood test in June.   Callback required yes/no and why: yes  Best contact number(s):  [phone]  Details to clarify the request:

## 2019-11-15 ENCOUNTER — HOSPITAL ENCOUNTER (OUTPATIENT)
Dept: LAB | Age: 54
Discharge: HOME | End: 2019-11-15

## 2019-11-15 DIAGNOSIS — R97.20 ELEVATED PSA: ICD-10-CM

## 2019-11-16 LAB
PSA SERPL-MCNC: 2.9 NG/ML (ref 0–4)
REFLEX CRITERIA: NORMAL

## 2019-11-21 ENCOUNTER — OFFICE VISIT (OUTPATIENT)
Dept: INTERNAL MEDICINE CLINIC | Age: 54
End: 2019-11-21

## 2019-11-21 VITALS
TEMPERATURE: 98.7 F | SYSTOLIC BLOOD PRESSURE: 120 MMHG | OXYGEN SATURATION: 98 % | DIASTOLIC BLOOD PRESSURE: 80 MMHG | WEIGHT: 184 LBS | RESPIRATION RATE: 16 BRPM | BODY MASS INDEX: 23.61 KG/M2 | HEART RATE: 81 BPM | HEIGHT: 74 IN

## 2019-11-21 DIAGNOSIS — F43.9 STRESS: ICD-10-CM

## 2019-11-21 DIAGNOSIS — J30.9 ALLERGIC RHINITIS, UNSPECIFIED SEASONALITY, UNSPECIFIED TRIGGER: ICD-10-CM

## 2019-11-21 DIAGNOSIS — R97.20 ELEVATED PSA: Primary | ICD-10-CM

## 2019-11-21 NOTE — PROGRESS NOTES
Taya Porter is a 53 y.o. male who presents today for Results and Labs  . He has a history of   Patient Active Problem List   Diagnosis Code    AR (allergic rhinitis) J30.9    Adenomatous colon polyp D12.6   . Today patient is here for follow-up. he does not have other concerns. Elevated PSA: Patient had an elevated PSA. We did repeat it which it did improve. This was last drawn last week and it continues to improve. Of note patient has stopped cycling since his elevated PSA and this seems to correlate with a lower PSA. Allergies: these have been stable. .     He has been under a bit more stress recently. ROS  Review of Systems   Constitutional: Negative for chills, fever and weight loss. HENT: Negative for congestion and sore throat. Eyes: Negative for blurred vision, double vision and photophobia. Respiratory: Negative for cough and shortness of breath. Cardiovascular: Negative for chest pain, palpitations and leg swelling. Gastrointestinal: Negative for abdominal pain, constipation, diarrhea, heartburn, nausea and vomiting. Genitourinary: Negative for dysuria, frequency and urgency. Musculoskeletal: Negative for joint pain and myalgias. Skin: Negative for rash. Neurological: Negative. Negative for headaches. Endo/Heme/Allergies: Does not bruise/bleed easily. Psychiatric/Behavioral: Negative for memory loss and suicidal ideas. Higher stress       Visit Vitals  /80 (BP 1 Location: Left arm, BP Patient Position: Sitting)   Pulse 81   Temp 98.7 °F (37.1 °C) (Oral)   Resp 16   Ht 6' 2\" (1.88 m)   Wt 184 lb (83.5 kg)   SpO2 98%   BMI 23.62 kg/m²       Physical Exam  Constitutional:       Appearance: He is well-developed. He is not diaphoretic. HENT:      Head: Normocephalic and atraumatic. Eyes:      Pupils: Pupils are equal, round, and reactive to light. Neck:      Musculoskeletal: Normal range of motion and neck supple. Vascular: No JVD. Cardiovascular:      Rate and Rhythm: Normal rate and regular rhythm. Heart sounds: No murmur. Pulmonary:      Effort: Pulmonary effort is normal. No respiratory distress. Breath sounds: Normal breath sounds. No wheezing. Abdominal:      General: Bowel sounds are normal. There is no distension. Palpations: Abdomen is soft. Tenderness: There is no tenderness. Musculoskeletal: Normal range of motion. Skin:     General: Skin is warm and dry. Neurological:      Mental Status: He is alert and oriented to person, place, and time. Cranial Nerves: No cranial nerve deficit. Psychiatric:         Behavior: Behavior normal.           Current Outpatient Medications   Medication Sig    fluticasone (FLONASE SENSIMIST) 27.5 mcg/actuation nasal spray 2 Sprays by Nasal route daily.  ibuprofen (MOTRIN) 200 mg tablet Take  by mouth.  naproxen sodium (ALEVE) 220 mg tablet Take 220 mg by mouth as needed.  multivitamin, stress formula (STRESS TAB) tablet Take 1 Tab by mouth daily.  omega-3 fatty acids-vitamin e (FISH OIL) 1,000 mg Cap Take 1 Cap by mouth.  loratadine (CLARITIN) 10 mg tablet Take 10 mg by mouth daily. No current facility-administered medications for this visit. Past Medical History:   Diagnosis Date    Ankle sprain 04/2016    Left Lateral Sprain      Past Surgical History:   Procedure Laterality Date    HX HERNIA REPAIR      bilateral    KNEE SCOPE,AID ANT CRUCIATE REPAIR      Right revision- Cali Ovens      Social History     Tobacco Use    Smoking status: Never Smoker    Smokeless tobacco: Never Used   Substance Use Topics    Alcohol use:  Yes     Alcohol/week: 8.3 standard drinks     Types: 10 Cans of beer per week     Comment: Social       Family History   Problem Relation Age of Onset    Cancer Mother         lung   [facility] Arthritis-rheumatoid Mother    [facility] Arthritis-rheumatoid Sister     COPD Father     Diabetes Neg Hx     Hypertension Neg Hx     Heart Disease Neg Hx     Elevated Lipids Neg Hx     Thyroid Disease Neg Hx         Allergies   Allergen Reactions    Codeine Other (comments)     Had halluanations, but no real allergy        Assessment/Plan  Diagnoses and all orders for this visit:    1. Elevated PSA -improved PSA. Of note patient has stopped cycling since elevated PSA. We discussed that we will repeat this in 6 months at his regular follow-up. -     PSA W/ REFLX FREE PSA; Future    2. Allergic rhinitis, unspecified seasonality, unspecified trigger -stable overall.  -     CBC WITH AUTOMATED DIFF; Future  -     METABOLIC PANEL, COMPREHENSIVE; Future  -     LIPID PANEL; Future    3. Stress -personal and work stress is been up a bit. Patient is doing okay with this. Tello Lopez MD  11/21/2019    This note was created with the help of speech recognition software Amos Diaz) and may contain some 'sound alike' errors.

## 2020-01-28 ENCOUNTER — TELEPHONE (OUTPATIENT)
Dept: INTERNAL MEDICINE CLINIC | Age: 55
End: 2020-01-28

## 2020-01-28 NOTE — TELEPHONE ENCOUNTER
LVM for Pt re: CPE on 4/6/20. Appt must be R/S d/t provider being out of office. Appt has been cancelled.

## 2020-03-04 DIAGNOSIS — J30.9 ALLERGIC RHINITIS, UNSPECIFIED SEASONALITY, UNSPECIFIED TRIGGER: ICD-10-CM

## 2020-03-04 DIAGNOSIS — R97.20 ELEVATED PSA: ICD-10-CM

## 2020-03-27 LAB
ALBUMIN SERPL-MCNC: 4.9 G/DL (ref 3.8–4.9)
ALBUMIN/GLOB SERPL: 2.1 {RATIO} (ref 1.2–2.2)
ALP SERPL-CCNC: 77 IU/L (ref 39–117)
ALT SERPL-CCNC: 19 IU/L (ref 0–44)
AST SERPL-CCNC: 20 IU/L (ref 0–40)
BASOPHILS # BLD AUTO: 0.1 X10E3/UL (ref 0–0.2)
BASOPHILS NFR BLD AUTO: 2 %
BILIRUB SERPL-MCNC: 1.3 MG/DL (ref 0–1.2)
BUN SERPL-MCNC: 13 MG/DL (ref 6–24)
BUN/CREAT SERPL: 15 (ref 9–20)
CALCIUM SERPL-MCNC: 9.9 MG/DL (ref 8.7–10.2)
CHLORIDE SERPL-SCNC: 100 MMOL/L (ref 96–106)
CHOLEST SERPL-MCNC: 207 MG/DL (ref 100–199)
CO2 SERPL-SCNC: 24 MMOL/L (ref 20–29)
CREAT SERPL-MCNC: 0.84 MG/DL (ref 0.76–1.27)
EOSINOPHIL # BLD AUTO: 0.3 X10E3/UL (ref 0–0.4)
EOSINOPHIL NFR BLD AUTO: 6 %
ERYTHROCYTE [DISTWIDTH] IN BLOOD BY AUTOMATED COUNT: 12.2 % (ref 11.6–15.4)
GLOBULIN SER CALC-MCNC: 2.3 G/DL (ref 1.5–4.5)
GLUCOSE SERPL-MCNC: 98 MG/DL (ref 65–99)
HCT VFR BLD AUTO: 43.8 % (ref 37.5–51)
HDLC SERPL-MCNC: 82 MG/DL
HGB BLD-MCNC: 15 G/DL (ref 13–17.7)
IMM GRANULOCYTES # BLD AUTO: 0 X10E3/UL (ref 0–0.1)
IMM GRANULOCYTES NFR BLD AUTO: 0 %
INTERPRETATION, 910389: NORMAL
LDLC SERPL CALC-MCNC: 113 MG/DL (ref 0–99)
LYMPHOCYTES # BLD AUTO: 1.3 X10E3/UL (ref 0.7–3.1)
LYMPHOCYTES NFR BLD AUTO: 30 %
MCH RBC QN AUTO: 31 PG (ref 26.6–33)
MCHC RBC AUTO-ENTMCNC: 34.2 G/DL (ref 31.5–35.7)
MCV RBC AUTO: 91 FL (ref 79–97)
MONOCYTES # BLD AUTO: 0.4 X10E3/UL (ref 0.1–0.9)
MONOCYTES NFR BLD AUTO: 10 %
NEUTROPHILS # BLD AUTO: 2.4 X10E3/UL (ref 1.4–7)
NEUTROPHILS NFR BLD AUTO: 52 %
PLATELET # BLD AUTO: 314 X10E3/UL (ref 150–450)
POTASSIUM SERPL-SCNC: 4.7 MMOL/L (ref 3.5–5.2)
PROT SERPL-MCNC: 7.2 G/DL (ref 6–8.5)
PSA SERPL-MCNC: 2.5 NG/ML (ref 0–4)
RBC # BLD AUTO: 4.84 X10E6/UL (ref 4.14–5.8)
REFLEX CRITERIA: NORMAL
SODIUM SERPL-SCNC: 139 MMOL/L (ref 134–144)
TRIGL SERPL-MCNC: 61 MG/DL (ref 0–149)
VLDLC SERPL CALC-MCNC: 12 MG/DL (ref 5–40)
WBC # BLD AUTO: 4.5 X10E3/UL (ref 3.4–10.8)

## 2020-03-30 ENCOUNTER — OFFICE VISIT (OUTPATIENT)
Dept: INTERNAL MEDICINE CLINIC | Age: 55
End: 2020-03-30

## 2020-03-30 VITALS
DIASTOLIC BLOOD PRESSURE: 74 MMHG | OXYGEN SATURATION: 98 % | TEMPERATURE: 98.1 F | RESPIRATION RATE: 16 BRPM | HEIGHT: 74 IN | SYSTOLIC BLOOD PRESSURE: 128 MMHG | WEIGHT: 188 LBS | HEART RATE: 70 BPM | BODY MASS INDEX: 24.13 KG/M2

## 2020-03-30 DIAGNOSIS — Z00.00 WELLNESS EXAMINATION: Primary | ICD-10-CM

## 2020-03-30 DIAGNOSIS — D12.6 ADENOMATOUS POLYP OF COLON, UNSPECIFIED PART OF COLON: ICD-10-CM

## 2020-03-30 NOTE — PROGRESS NOTES
Wendy Maradiaga is a 63 y.o. male who presents today for Complete Physical  .      He has a history of   Patient Active Problem List   Diagnosis Code    AR (allergic rhinitis) J30.9    Adenomatous colon polyp D12.6   . Today patient is here for complete physical exam.  Patient did get his blood work done before this appointment. .   he does not have other concerns. I did review his labs with him. His LDL is just slightly up from previous but his HDL looks great. His 10-year ASCVD risk is below 5. 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 History of elevated PSA: Year ago patient was noted to have an elevated PSA. Given age and percent free PSA we decided to monitor this over time. It has since progressively decreased. His PSA is at2.5 which is back near his baseline. Patient did note that he was cycling a good bit and he has limited this prior to lab draws. Health maintenance hx includes:  Exercise: very active. Form of exercise: Cardio and Weight. 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 He has worked and lived in several locations. Lives at home with wife and two children. His daughter is out of college and is planning on doing medical school. Cancer screening:    Colon cancer screening:  Last Colonoscopy: 2015 and was abnormal: A tubular adenoma and he will be due later this year.    Prostate cancer screening: PSA and/or SY: 2020 and was normal    Immunizations:     Immunization History   Administered Date(s) Administered    Influenza Vaccine 10/21/2013, 11/29/2017, 11/01/2018    Influenza Vaccine (Mdck Quadrivalent) 10/28/2019    Influenza Vaccine (Quad) PF 11/14/2018    Influenza Vaccine PF 09/29/2014    Influenza Vaccine Split 10/04/2010, 10/18/2011, 10/29/2012    TD Vaccine 10/04/2008    Tdap 12/20/2016 Immunization status: up to date and documented. ROS  Review of Systems   Constitutional: Negative for chills, fever and weight loss. HENT: Negative for congestion and sore throat. Eyes: Negative for blurred vision, double vision and photophobia. Respiratory: Negative for cough and shortness of breath. Cardiovascular: Negative for chest pain, palpitations and leg swelling. Gastrointestinal: Negative for abdominal pain, constipation, diarrhea, heartburn, nausea and vomiting. Genitourinary: Negative for dysuria, frequency and urgency. Musculoskeletal: Negative for joint pain and myalgias. Skin: Negative for rash. Neurological: Negative. Negative for headaches. Endo/Heme/Allergies: Does not bruise/bleed easily. Psychiatric/Behavioral: Negative for memory loss and suicidal ideas. Higher stress         Visit Vitals  /74 (BP 1 Location: Left arm, BP Patient Position: Sitting)   Pulse 70   Temp 98.1 °F (36.7 °C) (Oral)   Resp 16   Ht 6' 2\" (1.88 m)   Wt 188 lb (85.3 kg)   SpO2 98%   BMI 24.14 kg/m²       Physical Exam  Constitutional:       Appearance: He is well-developed. He is not diaphoretic. HENT:      Head: Normocephalic and atraumatic. Eyes:      Pupils: Pupils are equal, round, and reactive to light. Neck:      Musculoskeletal: Normal range of motion and neck supple. Vascular: No JVD. Cardiovascular:      Rate and Rhythm: Normal rate and regular rhythm. Heart sounds: No murmur. Pulmonary:      Effort: Pulmonary effort is normal. No respiratory distress. Breath sounds: Normal breath sounds. No wheezing. Abdominal:      General: Bowel sounds are normal. There is no distension. Palpations: Abdomen is soft. Tenderness: There is no abdominal tenderness. Musculoskeletal: Normal range of motion. Skin:     General: Skin is warm and dry. Neurological:      Mental Status: He is alert and oriented to person, place, and time.       Cranial Nerves: No cranial nerve deficit. Psychiatric:         Behavior: Behavior normal.           Current Outpatient Medications   Medication Sig    fluticasone (FLONASE SENSIMIST) 27.5 mcg/actuation nasal spray 2 Sprays by Nasal route daily.  ibuprofen (MOTRIN) 200 mg tablet Take  by mouth.  naproxen sodium (ALEVE) 220 mg tablet Take 220 mg by mouth as needed.  multivitamin, stress formula (STRESS TAB) tablet Take 1 Tab by mouth daily.  omega-3 fatty acids-vitamin e (FISH OIL) 1,000 mg Cap Take 1 Cap by mouth.  loratadine (CLARITIN) 10 mg tablet Take 10 mg by mouth daily. No current facility-administered medications for this visit. Past Medical History:   Diagnosis Date    Ankle sprain 04/2016    Left Lateral Sprain      Past Surgical History:   Procedure Laterality Date    HX HERNIA REPAIR      bilateral    OR KNEE SCOPE,AID ANT CRUCIATE REPAIR      Right revision- Divvyshot      Social History     Tobacco Use    Smoking status: Never Smoker    Smokeless tobacco: Never Used   Substance Use Topics    Alcohol use: Yes     Alcohol/week: 8.3 standard drinks     Types: 10 Cans of beer per week     Comment: Social       Family History   Problem Relation Age of Onset    Cancer Mother         lung    Arthritis-rheumatoid Mother     Arthritis-rheumatoid Sister     COPD Father     Diabetes Neg Hx     Hypertension Neg Hx     Heart Disease Neg Hx     Elevated Lipids Neg Hx     Thyroid Disease Neg Hx         Allergies   Allergen Reactions    Codeine Other (comments)     Had halluanations, but no real allergy        Assessment/Plan  Diagnoses and all orders for this visit:    1. Wellness examination- Stockbridge Bozena was counseled on age-appropriate/ guideline-based risk prevention behaviors and screening for a 63y.o. year old   male . We also discussed adjustments in screening based on family history if necessary.    Printed instructions for preventative screening guidelines and healthy behaviors given to patient with after visit summary. 2. Adenomatous polyp of colon, unspecified part of colon-due for repeat later this year            Malcom Soria MD  3/30/2020    This note was created with the help of speech recognition software [employer]) and may contain some 'sound alike' errors.

## 2020-10-16 ENCOUNTER — OFFICE VISIT (OUTPATIENT)
Dept: INTERNAL MEDICINE CLINIC | Age: 55
End: 2020-10-16
Payer: COMMERCIAL

## 2020-10-16 VITALS
WEIGHT: 180 LBS | DIASTOLIC BLOOD PRESSURE: 78 MMHG | TEMPERATURE: 98 F | SYSTOLIC BLOOD PRESSURE: 110 MMHG | HEIGHT: 74 IN | RESPIRATION RATE: 16 BRPM | BODY MASS INDEX: 23.1 KG/M2 | HEART RATE: 80 BPM | OXYGEN SATURATION: 99 %

## 2020-10-16 DIAGNOSIS — J30.9 ALLERGIC RHINITIS, UNSPECIFIED SEASONALITY, UNSPECIFIED TRIGGER: ICD-10-CM

## 2020-10-16 DIAGNOSIS — F43.21 ADJUSTMENT DISORDER WITH DEPRESSED MOOD: Primary | ICD-10-CM

## 2020-10-16 DIAGNOSIS — R04.0 EPISTAXIS: ICD-10-CM

## 2020-10-16 PROCEDURE — 99214 OFFICE O/P EST MOD 30 MIN: CPT | Performed by: INTERNAL MEDICINE

## 2020-10-16 RX ORDER — MELATONIN 5 MG
5 CAPSULE ORAL
COMMUNITY
End: 2021-09-09

## 2020-10-16 RX ORDER — AZELASTINE 1 MG/ML
1 SPRAY, METERED NASAL 2 TIMES DAILY
Qty: 1 BOTTLE | Refills: 3 | Status: SHIPPED | OUTPATIENT
Start: 2020-10-16 | End: 2021-03-24

## 2020-10-16 NOTE — PROGRESS NOTES
Patricio Iglesias is a 63 y.o. male who presents today for Epistaxis; Anxiety; and Depression  . He has a history of   Patient Active Problem List   Diagnosis Code    AR (allergic rhinitis) J30.9    Adenomatous colon polyp D12.6   . Today patient is here for an acute visit. .     Depression-since pandemic is started patient has been having more issues with his mental health. 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 Doing some part time PT. Mood is very low. Sleep has been ok, but more awakening. Has been exercising. No SI/HI. Does have a good relationship with his wife and his daughter. Patient overall feels his depression is rapidly worsening. Current symptoms include depressed mood, anhedonia and fatigue. Treatment includes None. Patient does not see a counselor. Denies current suicidal and homicidal plan or intent. We discussed that given the majority of his symptoms come from changes in employment and financial stressors I believe that seeing a therapist could help him start feeling better mentally. If symptoms persist could consider medications. His bleeds: Patient's been having more episodes of nosebleeds. He is on chronic Flonase which helps him with his allergies. Has had several episodes in the last week. One this morning. They are easy to stop but becoming more frequent. We discussed that the nasal steroids are likely the cause of this. We will try switching him to a nasal antihistamine. We discussed using Afrin to stop an acute nosebleed and if this persist could see ENT    ROS  Review of Systems   Constitutional: Negative for chills, fever and weight loss. HENT: Negative for congestion and sore throat. Nosebleeds   Eyes: Negative for blurred vision, double vision and photophobia. Respiratory: Negative for cough and shortness of breath.     Cardiovascular: Negative for chest pain, palpitations and leg swelling. Gastrointestinal: Negative for abdominal pain, constipation, diarrhea, heartburn, nausea and vomiting. Genitourinary: Negative for dysuria, frequency and urgency. Musculoskeletal: Negative for joint pain and myalgias. Skin: Negative for rash. Neurological: Negative. Negative for headaches. Endo/Heme/Allergies: Does not bruise/bleed easily. Psychiatric/Behavioral: Positive for depression. Negative for memory loss and suicidal ideas. Visit Vitals  /78 (BP 1 Location: Left arm, BP Patient Position: Sitting)   Pulse 80   Temp 98 °F (36.7 °C) (Oral)   Resp 16   Ht 6' 2\" (1.88 m)   Wt 180 lb (81.6 kg)   SpO2 99%   BMI 23.11 kg/m²       Physical Exam  Constitutional:       Appearance: He is well-developed. He is not diaphoretic. HENT:      Head: Normocephalic and atraumatic. Nose:      Comments: No active bleeding. Irritated nasal mucosa. Eyes:      Pupils: Pupils are equal, round, and reactive to light. Cardiovascular:      Rate and Rhythm: Normal rate and regular rhythm. Heart sounds: No murmur. Pulmonary:      Effort: Pulmonary effort is normal. No respiratory distress. Breath sounds: Normal breath sounds. Musculoskeletal: Normal range of motion. Skin:     General: Skin is warm and dry. Neurological:      Mental Status: He is alert and oriented to person, place, and time. Psychiatric:         Behavior: Behavior normal.      Comments: Mood low but patient makes good eye contact. Thought content normal           Current Outpatient Medications   Medication Sig    fluticasone (FLONASE SENSIMIST) 27.5 mcg/actuation nasal spray 2 Sprays by Nasal route daily.  ibuprofen (MOTRIN) 200 mg tablet Take  by mouth.  naproxen sodium (ALEVE) 220 mg tablet Take 220 mg by mouth as needed.  multivitamin, stress formula (STRESS TAB) tablet Take 1 Tab by mouth daily.  omega-3 fatty acids-vitamin e (FISH OIL) 1,000 mg Cap Take 1 Cap by mouth.     loratadine (CLARITIN) 10 mg tablet Take 10 mg by mouth daily.  docosahexanoic acid-eicosapent 120-180 mg cap Take 1 Cap by mouth. No current facility-administered medications for this visit. Past Medical History:   Diagnosis Date    Ankle sprain 04/2016    Left Lateral Sprain      Past Surgical History:   Procedure Laterality Date    HX HERNIA REPAIR      bilateral    [place] KNEE SCOPE,AID ANT CRUCIATE REPAIR      Right revision- Areli Ovens      Social History     Tobacco Use    Smoking status: Never Smoker    Smokeless tobacco: Never Used   Substance Use Topics    Alcohol use: Yes     Alcohol/week: 8.3 standard drinks     Types: 10 Cans of beer per week     Comment: Social       Family History   Problem Relation Age of Onset    Cancer Mother         lung    Arthritis-rheumatoid Mother     Arthritis-rheumatoid Sister     COPD Father     Diabetes Neg Hx     Hypertension Neg Hx     Heart Disease Neg Hx     Elevated Lipids Neg Hx     Thyroid Disease Neg Hx         Allergies   Allergen Reactions    Codeine Other (comments)     Had halluanations, but no real allergy        Assessment/Plan  Diagnoses and all orders for this visit:    1. Adjustment disorder with depressed mood-mainly surrounding loss of job and financial strain. We discussed seeing a therapist to discuss ways of helping his low moods. If this persists or becomes worse could consider medication. No other red flags. Total face-to face time was 25 minutes, greater than 50% of which was spent in counseling and coordination of care. The diagnosis, treatment and various other items were discussed in detail: Test results, medication options, possible side effects, lifestyle changes      2. Allergic rhinitis, unspecified seasonality, unspecified trigger  -     azelastine (ASTELIN) 137 mcg (0.1 %) nasal spray; 1 [place] by Both Nostrils route two (2) times a day.  Use in each nostril as directed    3. Epistaxis-likely due to chronic use of nasal steroid. Will switch to nasal antihistamine. Patient to use Afrin for as needed acute nosebleeds. Ed MD Lavelle  10/16/2020    This note was created with the help of speech recognition software Leopold Burden) and may contain some 'sound alike' errors.

## 2021-01-15 ENCOUNTER — TELEPHONE (OUTPATIENT)
Dept: INTERNAL MEDICINE CLINIC | Age: 56
End: 2021-01-15

## 2021-01-15 NOTE — TELEPHONE ENCOUNTER
----- Message from Jaison Campa sent at 1/15/2021 12:44 PM EST -----  Regarding: Dr. Wanda Porter / telephone  Level 1/Escalated Issue      Caller's first and last name and relationship (if not the patient): NA      Best contact number(s): [phone]      What are the symptoms:Swelling in 4th and 5th Fingers       Transfer successful - yes/no (include outcome): No - No Answer      Transfer declined - yes/no (include reason): No - No  Answer       Was caller advised to seek appropriate level of care - yes/no: Yes       Details to clarify the request: had to stop seeing patients due to swelling.          Jaison Campa

## 2021-03-24 ENCOUNTER — OFFICE VISIT (OUTPATIENT)
Dept: INTERNAL MEDICINE CLINIC | Age: 56
End: 2021-03-24
Payer: COMMERCIAL

## 2021-03-24 VITALS
BODY MASS INDEX: 23.1 KG/M2 | RESPIRATION RATE: 16 BRPM | TEMPERATURE: 98.1 F | DIASTOLIC BLOOD PRESSURE: 65 MMHG | OXYGEN SATURATION: 98 % | HEIGHT: 74 IN | HEART RATE: 72 BPM | SYSTOLIC BLOOD PRESSURE: 104 MMHG | WEIGHT: 180 LBS

## 2021-03-24 DIAGNOSIS — J30.9 ALLERGIC RHINITIS, UNSPECIFIED SEASONALITY, UNSPECIFIED TRIGGER: Primary | ICD-10-CM

## 2021-03-24 PROCEDURE — 99213 OFFICE O/P EST LOW 20 MIN: CPT | Performed by: INTERNAL MEDICINE

## 2021-03-24 RX ORDER — LEVOCETIRIZINE DIHYDROCHLORIDE 5 MG/1
5 TABLET, FILM COATED ORAL
COMMUNITY

## 2021-03-24 NOTE — PATIENT INSTRUCTIONS
Allergies: Care Instructions Your Care Instructions Allergies occur when your body's defense system (immune system) overreacts to certain substances. The immune system treats a harmless substance as if it were a harmful germ or virus. Many things can make this happen. These include pollens, medicine, food, dust, animal dander, and mold. Allergies can be mild or severe. Mild allergies can be managed with home treatment. But medicine may be needed to prevent problems. Managing your allergies is an important part of staying healthy. Your doctor may suggest that you have allergy testing to help find out what is causing your allergies. Severe allergies can cause reactions that affect your whole body (anaphylactic reactions). Your doctor may prescribe a shot of epinephrine to carry with you in case you have a severe reaction. Learn how to give yourself the shot and keep it with you at all times. Make sure it is not . Follow-up care is a key part of your treatment and safety. Be sure to make and go to all appointments, and call your doctor if you are having problems. It's also a good idea to know your test results and keep a list of the medicines you take. How can you care for yourself at home? · If you have been told by your doctor that dust or dust mites are causing your allergy, decrease the dust around your bed: 
? Wash sheets, pillowcases, and other bedding in hot water every week. ? Use dust-proof covers for pillows, duvets, and mattresses. Avoid plastic covers because they tear easily and do not \"breathe. \" Wash as instructed on the label. ? Do not use any blankets and pillows that you do not need. ? Use blankets that you can wash in your washing machine. ? Consider removing drapes and carpets, which attract and hold dust, from your bedroom. · If you are allergic to house dust and mites, do not use home humidifiers. Your doctor can suggest ways you can control dust and mites.  
· Look for signs of cockroaches. Cockroaches cause allergic reactions. Use cockroach baits to get rid of them. Then, clean your home well. Cockroaches like areas where grocery bags, newspapers, empty bottles, or cardboard boxes are stored. Do not keep these inside your home, and keep trash and food containers sealed. Seal off any spots where cockroaches might enter your home. · If you are allergic to mold, get rid of furniture, rugs, and drapes that smell musty. Check for mold in the bathroom. · If you are allergic to outdoor pollen or mold spores, use air-conditioning. Change or clean all filters every month. Keep windows closed. · If you are allergic to pollen, stay inside when pollen counts are high. Use a vacuum  with a HEPA filter or a double-thickness filter at least two times each week. · Stay inside when air pollution is bad. Avoid paint fumes, perfumes, and other strong odors. · Avoid conditions that make your allergies worse. Stay away from smoke. Do not smoke or let anyone else smoke in your house. Do not use fireplaces or wood-burning stoves. · If you are allergic to your pets, change the air filter in your furnace every month. Use high-efficiency filters. · If you are allergic to pet dander, keep pets outside or out of your bedroom. Old carpet and cloth furniture can hold a lot of animal dander. You may need to replace them. When should you call for help? Give an epinephrine shot if: 
  · You think you are having a severe allergic reaction.  
  · You have symptoms in more than one body area, such as mild nausea and an itchy mouth. After giving an epinephrine shot call 911, even if you feel better. Call 911 if: 
  · You have symptoms of a severe allergic reaction. These may include: 
? Sudden raised, red areas (hives) all over your body. ? Swelling of the throat, mouth, lips, or tongue. ? Trouble breathing. ? Passing out (losing consciousness).  Or you may feel very lightheaded or suddenly feel weak, confused, or restless.  
  · You have been given an epinephrine shot, even if you feel better. Call your doctor now or seek immediate medical care if: 
  · You have symptoms of an allergic reaction, such as: ? A rash or hives (raised, red areas on the skin). ? Itching. ? Swelling. ? Belly pain, nausea, or vomiting. Watch closely for changes in your health, and be sure to contact your doctor if: 
  · You do not get better as expected. Where can you learn more? Go to http://www.gray.com/ Enter N245 in the search box to learn more about \"Allergies: Care Instructions. \" Current as of: June 29, 2020               Content Version: 12.6 © 1773-1174 [employer]. Care instructions adapted under license by [employer] (which disclaims liability or warranty for this information). If you have questions about a medical condition or this instruction, always ask your healthcare professional. [place] [address] any warranty or liability for your use of this information.

## 2021-03-24 NOTE — PROGRESS NOTES
Eebr Hilario is a 61 y.o. male who presents today for Epistaxis  . He has a history of   Patient Active Problem List   Diagnosis Code    AR (allergic rhinitis) J30.9    Adenomatous colon polyp D12.6   . Today patient is here for f/u. Problem visit:  Eber Hilario is here for complaint of epistaxis. Last nosebleed on 3/10. Has stopped nasal spray which has helped. Is not had a nosebleed in 2 weeks now. Having increase in rhinitis. Still on Claritin. Still has a small lesion on his right nostril. 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 ROS  Review of Systems   Constitutional: Negative for chills, fever and weight loss. HENT: Positive for congestion and nosebleeds (resolved). Eyes: Negative for double vision, photophobia and pain. Respiratory: Negative for cough and shortness of breath. Cardiovascular: Negative for chest pain, palpitations and leg swelling. Gastrointestinal: Negative for abdominal pain, nausea and vomiting. Neurological: Negative. Endo/Heme/Allergies: Does not bruise/bleed easily. Psychiatric/Behavioral: Negative for depression. The patient is not nervous/anxious. Visit Vitals  /65 (BP 1 Location: Left upper arm, BP Patient Position: Sitting, BP Cuff Size: Adult)   Pulse 72   Temp 98.1 °F (36.7 °C) (Oral)   Resp 16   Ht 6' 2\" (1.88 m)   Wt 180 lb (81.6 kg)   SpO2 98%   BMI 23.11 kg/m²       Physical Exam  Constitutional:       Appearance: He is well-developed. He is not diaphoretic. HENT:      Head: Normocephalic and atraumatic. Left Ear: Tympanic membrane normal.      Nose:      Comments: Small almost ulcerated lesion to right nostril. Otherwise does have erythema to the nasal mucosa. No other signs of bleeding. Eyes:      Pupils: Pupils are equal, round, and reactive to light. Cardiovascular:      Rate and Rhythm: Normal rate and regular rhythm. Heart sounds: No murmur.    Pulmonary:      Effort: Pulmonary effort is normal. No respiratory distress.      Breath sounds: Normal breath sounds.   Musculoskeletal: Normal range of motion.   Skin:     General: Skin is warm and dry.   Neurological:      Mental Status: He is alert and oriented to person, place, and time.   Psychiatric:         Behavior: Behavior normal.           Current Outpatient Medications   Medication Sig   • melatonin 5 mg cap capsule Take 5 mg by mouth nightly.   • ibuprofen (MOTRIN) 200 mg tablet Take  by mouth.   • naproxen sodium (ALEVE) 220 mg tablet Take 220 mg by mouth as needed.   • multivitamin, stress formula (STRESS TAB) tablet Take 1 Tab by mouth daily.   • omega-3 fatty acids-vitamin e (FISH OIL) 1,000 mg Cap Take 1 Cap by mouth.   • loratadine (CLARITIN) 10 mg tablet Take 10 mg by mouth daily.   • azelastine (ASTELIN) 137 mcg (0.1 %) nasal spray 1 [place] by Both Nostrils route two (2) times a day. Use in each nostril as directed     No current facility-administered medications for this visit.         Past Medical History:   Diagnosis Date   • Ankle sprain 04/2016    Left Lateral Sprain      Past Surgical History:   Procedure Laterality Date   • HX HERNIA REPAIR      bilateral   • [place] KNEE SCOPE,AID ANT CRUCIATE REPAIR      Right revision- [employer]      Social History     Tobacco Use   • Smoking status: Never Smoker   • Smokeless tobacco: Never Used   Substance Use Topics   • Alcohol use: Yes     Alcohol/week: 8.3 standard drinks     Types: 10 Cans of beer per week     Comment: Social       Family History   Problem Relation Age of Onset   • Cancer Mother         lung   • Arthritis-rheumatoid Mother    • Arthritis-rheumatoid Sister    • COPD Father    • Diabetes Neg Hx    • Hypertension Neg Hx    • Heart Disease Neg Hx    • Elevated Lipids Neg Hx    • Thyroid Disease Neg Hx         Allergies   Allergen Reactions   • Codeine Other (comments)     Had halluanations, but no real allergy        Assessment/Plan  Diagnoses and all orders for this  visit: 1. Allergic rhinitis, unspecified seasonality, unspecified trigger-we discussed options including changing antihistamines versus adding Singulair. It appears that patient is quite sensitive to nasal sprays and tends to get nosebleeds. Would stay away from these and just use them for as needed use. Patient will try Xyzal and otherwise will let us know we can call in Singulair to add to his antihistamine. Third option would be to see allergist.            Alicia Bailey MD  3/24/2021    This note was created with the help of speech recognition software [employer]) and may contain some 'sound alike' errors.

## 2021-09-09 ENCOUNTER — OFFICE VISIT (OUTPATIENT)
Dept: INTERNAL MEDICINE CLINIC | Age: 56
End: 2021-09-09
Payer: COMMERCIAL

## 2021-09-09 VITALS
TEMPERATURE: 97.9 F | HEIGHT: 74 IN | RESPIRATION RATE: 16 BRPM | WEIGHT: 183 LBS | BODY MASS INDEX: 23.49 KG/M2 | DIASTOLIC BLOOD PRESSURE: 73 MMHG | HEART RATE: 65 BPM | OXYGEN SATURATION: 98 % | SYSTOLIC BLOOD PRESSURE: 122 MMHG

## 2021-09-09 DIAGNOSIS — Z00.00 WELLNESS EXAMINATION: Primary | ICD-10-CM

## 2021-09-09 DIAGNOSIS — J30.9 ALLERGIC RHINITIS, UNSPECIFIED SEASONALITY, UNSPECIFIED TRIGGER: ICD-10-CM

## 2021-09-09 DIAGNOSIS — J34.89 NASAL LESION: ICD-10-CM

## 2021-09-09 PROCEDURE — 99396 PREV VISIT EST AGE 40-64: CPT | Performed by: INTERNAL MEDICINE

## 2021-09-09 NOTE — PROGRESS NOTES
Izora Osgood Lickers  Identified pt with two pt identifiers(name and ). Chief Complaint   Patient presents with    Complete Physical     RM19//pt is presenting today for annual CPE wants to discuss sinus issues       1. Have you been to the ER, urgent care clinic since your last visit? Hospitalized since your last visit? NO    2. Have you seen or consulted any other health care providers outside of the [address] [place] [address] since your last visit? Include any pap smears or colon screening. NO      Provider notified of reason for visit, vitals and flowsheets obtained on patients.      Patient received paperwork for advance directive during previous visit but has not completed at this time     Reviewed record In preparation for visit, huddled with provider and have obtained necessary documentation      Health Maintenance Due   Topic    Shingrix Vaccine Age 50> (1 of 2)    Colorectal Cancer Screening Combo     Flu Vaccine (1)       Wt Readings from Last 3 Encounters:   21 183 lb (83 kg)   21 180 lb (81.6 kg)   10/16/20 180 lb (81.6 kg)     Temp Readings from Last 3 Encounters:   21 97.9 °F (36.6 °C) (Oral)   21 98.1 °F (36.7 °C) (Oral)   10/16/20 98 °F (36.7 °C) (Oral)     BP Readings from Last 3 Encounters:   21 122/73   21 104/65   10/16/20 110/78     Pulse Readings from Last 3 Encounters:   21 65   21 72   10/16/20 80     Vitals:    21 0934   BP: 122/73   Pulse: 65   Resp: 16   Temp: 97.9 °F (36.6 °C)   TempSrc: Oral   SpO2: 98%   Weight: 183 lb (83 kg)   Height: 6' 2\" (1.88 m)   PainSc:   0 - No pain         Learning Assessment:  :     Learning Assessment 2015   PRIMARY LEARNER Patient Patient   HIGHEST LEVEL OF EDUCATION - PRIMARY LEARNER  - > 4 YEARS OF COLLEGE   BARRIERS PRIMARY LEARNER - NONE   CO-LEARNER CAREGIVER - No   PRIMARY LANGUAGE ENGLISH ENGLISH   LEARNER PREFERENCE PRIMARY - DEMONSTRATION   ANSWERED BY patient self RELATIONSHIP SELF SELF       Depression Screening:  :     3 most recent PHQ Screens 3/24/2021   Little interest or pleasure in doing things Not at all   Feeling down, depressed, irritable, or hopeless Not at all   Total Score PHQ 2 0   Trouble falling or staying asleep, or sleeping too much -   Feeling tired or having little energy -   Poor appetite, weight loss, or overeating -   Feeling bad about yourself - or that you are a failure or have let yourself or your family down -   Trouble concentrating on things such as school, work, reading, or watching TV -   Moving or speaking so slowly that other people could have noticed; or the opposite being so fidgety that others notice -   Thoughts of being better off dead, or hurting yourself in some way -   PHQ 9 Score -   How difficult have these problems made it for you to do your work, take care of your home and get along with others -       Fall Risk Assessment:  :     Fall Risk Assessment, last 12 mths 3/24/2021   Able to walk? Yes   Fall in past 12 months? 0   Do you feel unsteady? 0   Are you worried about falling 0       Abuse Screening:  :     Abuse Screening Questionnaire 3/24/2021 10/16/2020 3/30/2020 11/21/2019 3/18/2019 12/21/2017   Do you ever feel afraid of your partner? N N N N N N   Are you in a relationship with someone who physically or mentally threatens you? N N N N N N   Is it safe for you to go home? Y Y Y Y Y Y       ADL Screening:  :     No flowsheet data found. Medication reconciliation up to date and corrected with patient at this time.

## 2021-09-09 NOTE — PATIENT INSTRUCTIONS
Well Visit, Men 48 to 72: Care Instructions  Overview     Well visits can help you stay healthy. Your doctor has checked your overall health and may have suggested ways to take good care of yourself. Your doctor also may have recommended tests. At home, you can help prevent illness with healthy eating, regular exercise, and other steps. Follow-up care is a key part of your treatment and safety. Be sure to make and go to all appointments, and call your doctor if you are having problems. It's also a good idea to know your test results and keep a list of the medicines you take. How can you care for yourself at home? · Get screening tests that you and your doctor decide on. Screening helps find diseases before any symptoms appear. · Eat healthy foods. Choose fruits, vegetables, whole grains, protein, and low-fat dairy foods. Limit fat, especially saturated fat. Reduce salt in your diet. · Limit alcohol. Have no more than 2 drinks a day or 14 drinks a week. · Get at least 30 minutes of exercise on most days of the week. Walking is a good choice. You also may want to do other activities, such as running, swimming, cycling, or playing tennis or team sports. · Reach and stay at a healthy weight. This will lower your risk for many problems, such as obesity, diabetes, heart disease, and high blood pressure. · Do not smoke. Smoking can make health problems worse. If you need help quitting, talk to your doctor about stop-smoking programs and medicines. These can increase your chances of quitting for good. · Care for your mental health. It is easy to get weighed down by worry and stress. Learn strategies to manage stress, like deep breathing and mindfulness, and stay connected with your family and community. If you find you often feel sad or hopeless, talk with your doctor. Treatment can help. · Talk to your doctor about whether you have any risk factors for sexually transmitted infections (STIs).  You can help prevent STIs if you wait to have sex with a new partner (or partners) until you've each been tested for STIs. It also helps if you use condoms (male or female condoms) and if you limit your sex partners to one person who only has sex with you. Vaccines are available for some STIs. · If it's important to you to prevent pregnancy with your partner, talk with your doctor about birth control options that might be best for you. · If you think you may have a problem with alcohol or drug use, talk to your doctor. This includes prescription medicines (such as amphetamines and opioids) and illegal drugs (such as cocaine and methamphetamine). Your doctor can help you figure out what type of treatment is best for you. · Protect your skin from too much sun. When you're outdoors from 10 a.m. to 4 p.m., stay in the shade or cover up with clothing and a hat with a wide brim. Wear sunglasses that block UV rays. Even when it's cloudy, put broad-spectrum sunscreen (SPF 30 or higher) on any exposed skin. · See a dentist one or two times a year for checkups and to have your teeth cleaned. · Wear a seat belt in the car. When should you call for help? Watch closely for changes in your health, and be sure to contact your doctor if you have any problems or symptoms that concern you. Where can you learn more? Go to http://www.gray.com/  Enter J312 in the search box to learn more about \"Well Visit, Men 48 to 72: Care Instructions. \"  Current as of: May 27, 2020               Content Version: 12.8  © 2331-4163 [employer], Incorporated. Care instructions adapted under license by [employer] (which disclaims liability or warranty for this information). If you have questions about a medical condition or this instruction, always ask your healthcare professional. [address] any warranty or liability for your use of this information.

## 2021-09-09 NOTE — PROGRESS NOTES
Erica Bourne is a 61 y.o. male who presents today for Complete Physical (RM19//pt is presenting today for annual CPE wants to discuss sinus issues)  . He has a history of   Patient Active Problem List   Diagnosis Code    AR (allergic rhinitis) J30.9    Adenomatous colon polyp D12.6   . Today patient is here for complete physical exam..     Allergic Rhinitis: Patient with chronic sinus issues. Last visit we had asked him to slow down on his nasal steroid as this was causing some nosebleeds. Since he reports overall things are stable, but still has a lesion to right naris. This does tend to swell before it bleeds. Stress/Anx: Mainly surrounding work. Since he reports that his mental health has been stable. Health maintenance hx includes:  Exercise: very active. Form of exercise: Cardio and Weight. Diet: generally follows a low fat low cholesterol diet, nonsmoker, alcohol intake daily  Social: Sports medicine PT, working part time. [employer]. Would rather do her sports Jesika Montez has worked and lived in several locations. He is hopeful that he will soon have a more permanent position.               Lives at home with wife. One will be doing med-school. One in [place].      Cancer screening:    Colon cancer screening:  Last Colonoscopy: 2015 and was abnormal: 5 yr f/u   Prostate cancer screening: PSA and/or SY: 2020 and was normal    Immunizations:     Immunization History   Administered Date(s) Administered    Covid-19, MODERNA, Mrna, Lnp-s, Pf, 100mcg/0.5mL 01/21/2021, 02/18/2021    Influenza Vaccine 10/21/2013, 11/29/2017, 11/01/2018    Influenza Vaccine (Mdck Quadrivalent)(>2 Yrs Flucelvax Quad vial 90170) 10/28/2019, 09/28/2020    Influenza Vaccine (Quad) PF (>6 Mo Flulaval, Fluarix, and >3 Yrs Afluria, Fluzone 15770) 11/14/2018    Influenza Vaccine PF 09/29/2014    Influenza Vaccine Split 10/04/2010, 10/18/2011, 10/29/2012    TD Vaccine 10/04/2008    Tdap 12/20/2016 Immunization status: up to date and documented. ROS  Review of Systems   Constitutional: Negative for chills, fever and weight loss. HENT: Positive for nosebleeds (lesion to r nare). Negative for congestion and sore throat. Eyes: Negative for blurred vision, double vision and photophobia. Respiratory: Negative for cough and shortness of breath. Cardiovascular: Negative for chest pain, palpitations and leg swelling. Gastrointestinal: Negative for abdominal pain, constipation, diarrhea, heartburn, nausea and vomiting. Genitourinary: Negative for dysuria, frequency and urgency. Musculoskeletal: Negative for joint pain and myalgias. Skin: Negative for rash. Neurological: Negative. Negative for headaches. Endo/Heme/Allergies: Does not bruise/bleed easily. Psychiatric/Behavioral: Negative for memory loss and suicidal ideas. Visit Vitals  /73 (BP 1 Location: Left upper arm, BP Patient Position: Sitting, BP Cuff Size: Adult)   Pulse 65   Temp 97.9 °F (36.6 °C) (Oral)   Resp 16   Ht 6' 2\" (1.88 m)   Wt 183 lb (83 kg)   SpO2 98%   BMI 23.50 kg/m²       Physical Exam  Constitutional:       Appearance: He is well-developed. He is not diaphoretic. HENT:      Head: Normocephalic and atraumatic. Comments: R nostril lesion to medial aspect. Nose:      Comments: Small white lesion to the medial aspect of right naris. Slightly raised. Eyes:      Pupils: Pupils are equal, round, and reactive to light. Neck:      Vascular: No JVD. Cardiovascular:      Rate and Rhythm: Normal rate and regular rhythm. Heart sounds: No murmur heard. Pulmonary:      Effort: Pulmonary effort is normal. No respiratory distress. Breath sounds: Normal breath sounds. No wheezing. Abdominal:      General: Bowel sounds are normal. There is no distension. Palpations: Abdomen is soft. Tenderness: There is no abdominal tenderness.    Musculoskeletal:         General: Normal range of motion. Cervical back: Normal range of motion and neck supple. Skin:     General: Skin is warm and dry. Neurological:      Mental Status: He is alert and oriented to person, place, and time. Cranial Nerves: No cranial nerve deficit. Psychiatric:         Behavior: Behavior normal.           Current Outpatient Medications   Medication Sig    levocetirizine (XYZAL) 5 mg tablet Take 5 mg by mouth.  ibuprofen (MOTRIN) 200 mg tablet Take  by mouth.  naproxen sodium (ALEVE) 220 mg tablet Take 220 mg by mouth as needed.  multivitamin, stress formula (STRESS TAB) tablet Take 1 Tab by mouth daily.  omega-3 fatty acids-vitamin e (FISH OIL) 1,000 mg Cap Take 1 Cap by mouth.  melatonin 5 mg cap capsule Take 5 mg by mouth nightly. (Patient not taking: Reported on 9/9/2021)    loratadine (CLARITIN) 10 mg tablet Take 10 mg by mouth daily. (Patient not taking: Reported on 9/9/2021)     No current facility-administered medications for this visit. Past Medical History:   Diagnosis Date    Ankle sprain 04/2016    Left Lateral Sprain      Past Surgical History:   Procedure Laterality Date    HX HERNIA REPAIR      bilateral    OK KNEE SCOPE,AID ANT CRUCIATE REPAIR      Right revision- Caprice Solis      Social History     Tobacco Use    Smoking status: Never Smoker    Smokeless tobacco: Never Used   Substance Use Topics    Alcohol use:  Yes     Alcohol/week: 8.3 standard drinks     Types: 10 Cans of beer per week     Comment: Social       Family History   Problem Relation Age of Onset    Cancer Mother         lung    Arthritis-rheumatoid Mother     Arthritis-rheumatoid Sister     COPD Father     Diabetes Neg Hx     Hypertension Neg Hx     Heart Disease Neg Hx     Elevated Lipids Neg Hx     Thyroid Disease Neg Hx         Allergies   Allergen Reactions    Codeine Other (comments)     Had halluanations, but no real allergy        Assessment/Plan  Diagnoses and all orders for this visit: 1. Wellness examination- mental health doing well. -     CBC WITH AUTOMATED DIFF; Future  -     METABOLIC PANEL, COMPREHENSIVE; Future  -     PSA SCREENING (SCREENING); Future  -     LIPID PANEL; Future  -     REFERRAL TO GASTROENTEROLOGY    2. Nasal lesion-would like ENT to take a look at this and see if it needs to be biopsied. Recurrent lesion that sometimes bleeds.  -     REFERRAL TO ENT-OTOLARYNGOLOGY    3. Allergic rhinitis, unspecified seasonality, unspecified trigger-  Continue PO antihistamine. Rayo Arroyo MD  9/9/2021    This note was created with the help of speech recognition software [employer]) and may contain some 'sound alike' errors.

## 2021-09-10 LAB
ALBUMIN SERPL-MCNC: 4.7 G/DL (ref 3.8–4.9)
ALBUMIN/GLOB SERPL: 1.7 {RATIO} (ref 1.2–2.2)
ALP SERPL-CCNC: 83 IU/L (ref 48–121)
ALT SERPL-CCNC: 17 IU/L (ref 0–44)
AST SERPL-CCNC: 22 IU/L (ref 0–40)
BASOPHILS # BLD AUTO: 0.1 X10E3/UL (ref 0–0.2)
BASOPHILS NFR BLD AUTO: 2 %
BILIRUB SERPL-MCNC: 1.4 MG/DL (ref 0–1.2)
BUN SERPL-MCNC: 11 MG/DL (ref 6–24)
BUN/CREAT SERPL: 14 (ref 9–20)
CALCIUM SERPL-MCNC: 10 MG/DL (ref 8.7–10.2)
CHLORIDE SERPL-SCNC: 103 MMOL/L (ref 96–106)
CHOLEST SERPL-MCNC: 193 MG/DL (ref 100–199)
CO2 SERPL-SCNC: 25 MMOL/L (ref 20–29)
CREAT SERPL-MCNC: 0.8 MG/DL (ref 0.76–1.27)
EOSINOPHIL # BLD AUTO: 0.4 X10E3/UL (ref 0–0.4)
EOSINOPHIL NFR BLD AUTO: 10 %
ERYTHROCYTE [DISTWIDTH] IN BLOOD BY AUTOMATED COUNT: 12.1 % (ref 11.6–15.4)
GLOBULIN SER CALC-MCNC: 2.7 G/DL (ref 1.5–4.5)
GLUCOSE SERPL-MCNC: 94 MG/DL (ref 65–99)
HCT VFR BLD AUTO: 44.5 % (ref 37.5–51)
HDLC SERPL-MCNC: 81 MG/DL
HGB BLD-MCNC: 15.2 G/DL (ref 13–17.7)
IMM GRANULOCYTES # BLD AUTO: 0 X10E3/UL (ref 0–0.1)
IMM GRANULOCYTES NFR BLD AUTO: 0 %
IMP & REVIEW OF LAB RESULTS: NORMAL
LDLC SERPL CALC-MCNC: 101 MG/DL (ref 0–99)
LYMPHOCYTES # BLD AUTO: 1.3 X10E3/UL (ref 0.7–3.1)
LYMPHOCYTES NFR BLD AUTO: 31 %
MCH RBC QN AUTO: 30.9 PG (ref 26.6–33)
MCHC RBC AUTO-ENTMCNC: 34.2 G/DL (ref 31.5–35.7)
MCV RBC AUTO: 90 FL (ref 79–97)
MONOCYTES # BLD AUTO: 0.4 X10E3/UL (ref 0.1–0.9)
MONOCYTES NFR BLD AUTO: 10 %
NEUTROPHILS # BLD AUTO: 2 X10E3/UL (ref 1.4–7)
NEUTROPHILS NFR BLD AUTO: 47 %
PLATELET # BLD AUTO: 340 X10E3/UL (ref 150–450)
POTASSIUM SERPL-SCNC: 5 MMOL/L (ref 3.5–5.2)
PROT SERPL-MCNC: 7.4 G/DL (ref 6–8.5)
PSA SERPL-MCNC: 4 NG/ML (ref 0–4)
RBC # BLD AUTO: 4.92 X10E6/UL (ref 4.14–5.8)
SODIUM SERPL-SCNC: 140 MMOL/L (ref 134–144)
TRIGL SERPL-MCNC: 58 MG/DL (ref 0–149)
VLDLC SERPL CALC-MCNC: 11 MG/DL (ref 5–40)
WBC # BLD AUTO: 4.1 X10E3/UL (ref 3.4–10.8)

## 2021-09-14 DIAGNOSIS — R97.20 ELEVATED PSA: Primary | ICD-10-CM

## 2021-11-30 LAB
PSA FREE MFR SERPL: 24.1 %
PSA FREE SERPL-MCNC: 1.06 NG/ML
PSA SERPL-MCNC: 4.4 NG/ML (ref 0–4)
REFLEX CRITERIA: ABNORMAL

## 2021-12-03 ENCOUNTER — TELEPHONE (OUTPATIENT)
Dept: INTERNAL MEDICINE CLINIC | Age: 56
End: 2021-12-03

## 2021-12-03 NOTE — TELEPHONE ENCOUNTER
Attempts were made to reach patient by phone to discuss elevated PSA. Percent free PSA is reassuring, but given history of mildly elevated PSA I would like him to be seen by urologist.  We will try making contact by phone next week.

## 2021-12-06 NOTE — PROGRESS NOTES
I have tried reaching patient by phone a couple times. Unable to get to him directly. Please let him know that his PSA is still slightly elevated. No significant changes, but given persistent elevation I would like him seen by urology. If he is agreeable can we get an appointment with [facility] for him to be seen. Please fax PSA results. I can give him a call back if you would like to discuss this further.

## 2021-12-07 NOTE — PROGRESS NOTES
Called pt on numbers listed in chart with no answer, left messages asking pt to return call to the office regarding lab results.

## 2021-12-08 ENCOUNTER — TELEPHONE (OUTPATIENT)
Dept: INTERNAL MEDICINE CLINIC | Age: 56
End: 2021-12-08

## 2021-12-08 DIAGNOSIS — R97.20 ELEVATED PSA: Primary | ICD-10-CM

## 2021-12-08 NOTE — TELEPHONE ENCOUNTER
----- Message from Rashmi Pan MD sent at 12/6/2021  5:08 PM EST -----  I have tried reaching patient by phone a couple times. Unable to get to him directly. Please let him know that his PSA is still slightly elevated. No significant changes, but given persistent elevation I would like him seen by urology. If he is agreeable can we get an appointment with [facility] for him to be seen. Please fax PSA results. I can give him a call back if you would like to discuss this further.

## 2021-12-08 NOTE — TELEPHONE ENCOUNTER
Called pt left message regarding labwork and being set up for appt with [facility]. Asked pt to call the office for further information.

## 2021-12-14 NOTE — TELEPHONE ENCOUNTER
Spoke to pt he is agreeing to see [facility], pt requested referral to be mailed out address was verified.

## 2023-01-11 ENCOUNTER — OFFICE VISIT (OUTPATIENT)
Dept: INTERNAL MEDICINE CLINIC | Age: 58
End: 2023-01-11
Payer: COMMERCIAL

## 2023-01-11 VITALS
TEMPERATURE: 97.6 F | BODY MASS INDEX: 24.13 KG/M2 | SYSTOLIC BLOOD PRESSURE: 123 MMHG | OXYGEN SATURATION: 100 % | WEIGHT: 188 LBS | RESPIRATION RATE: 16 BRPM | HEART RATE: 71 BPM | HEIGHT: 74 IN | DIASTOLIC BLOOD PRESSURE: 76 MMHG

## 2023-01-11 DIAGNOSIS — R05.9 COUGH, UNSPECIFIED TYPE: Primary | ICD-10-CM

## 2023-01-11 DIAGNOSIS — J32.9 SINUSITIS, UNSPECIFIED CHRONICITY, UNSPECIFIED LOCATION: ICD-10-CM

## 2023-01-11 LAB
QUICKVUE INFLUENZA TEST: NEGATIVE
SARS-COV-2 POC: NEGATIVE
VALID INTERNAL CONTROL?: YES

## 2023-01-11 PROCEDURE — 87804 INFLUENZA ASSAY W/OPTIC: CPT | Performed by: INTERNAL MEDICINE

## 2023-01-11 PROCEDURE — 87426 SARSCOV CORONAVIRUS AG IA: CPT | Performed by: INTERNAL MEDICINE

## 2023-01-11 PROCEDURE — 99213 OFFICE O/P EST LOW 20 MIN: CPT | Performed by: INTERNAL MEDICINE

## 2023-01-11 RX ORDER — GUAIFENESIN 600 MG/1
600 TABLET, EXTENDED RELEASE ORAL 2 TIMES DAILY
COMMUNITY

## 2023-01-11 RX ORDER — AMOXICILLIN AND CLAVULANATE POTASSIUM 875; 125 MG/1; MG/1
1 TABLET, FILM COATED ORAL EVERY 12 HOURS
Qty: 20 TABLET | Refills: 0 | Status: SHIPPED | OUTPATIENT
Start: 2023-01-11 | End: 2023-01-21

## 2023-01-11 RX ORDER — LORATADINE 10 MG/1
10 TABLET ORAL
COMMUNITY

## 2023-01-11 NOTE — PROGRESS NOTES
Doron Bermudez is a 60 y.o. male who presents today for Cough (Cough; congestion; yellow phlegm; started 01/08)  . He has a history of   Patient Active Problem List   Diagnosis Code    AR (allergic rhinitis) J30.9    Adenomatous colon polyp D12.6   . Today patient is her efor an cute visit. Kem Reina Upper respiratory illness: Doron Bermudez presents with complaints of swollen glands, productive cough, headache, and left sinus pain for 5 days. no nausea and no vomiting . he has not had  fever and chills. Symptoms are moderate. Over-the-counter remedies including Claritin. Drinking plenty of fluids: no  Contacts with similar infections: no     Elevated PSA: monitoring PSA, negative MRI. ROS  Review of Systems   Constitutional:  Negative for chills, fever and weight loss. HENT:  Positive for congestion, sinus pain and sore throat. Eyes:  Negative for blurred vision, double vision and photophobia. Respiratory:  Positive for cough. Negative for shortness of breath. Cardiovascular:  Negative for chest pain, palpitations and leg swelling. Gastrointestinal:  Negative for abdominal pain, constipation, diarrhea, heartburn, nausea and vomiting. Genitourinary:  Negative for dysuria, frequency and urgency. Musculoskeletal:  Negative for joint pain and myalgias. Skin:  Negative for rash. Neurological: Negative. Negative for headaches. Endo/Heme/Allergies:  Does not bruise/bleed easily. Psychiatric/Behavioral:  Negative for memory loss and suicidal ideas. Visit Vitals  /76 (BP 1 Location: Left upper arm, BP Patient Position: Sitting, BP Cuff Size: Adult)   Pulse 71   Temp 97.6 °F (36.4 °C) (Oral)   Resp 16   Ht 6' 2\" (1.88 m)   Wt 188 lb (85.3 kg)   SpO2 100%   BMI 24.14 kg/m²       Physical Exam  Constitutional:       Appearance: He is well-developed. He is not diaphoretic. HENT:      Head: Normocephalic and atraumatic.       Right Ear: Tympanic membrane normal.      Left Ear: Tympanic membrane normal.      Nose: Nose normal.   Eyes:      Pupils: Pupils are equal, round, and reactive to light. Cardiovascular:      Rate and Rhythm: Normal rate and regular rhythm. Heart sounds: No murmur heard. Pulmonary:      Effort: Pulmonary effort is normal. No respiratory distress. Breath sounds: Normal breath sounds. Comments: Clear  Musculoskeletal:         General: Normal range of motion. Skin:     General: Skin is warm and dry. Neurological:      Mental Status: He is alert and oriented to person, place, and time. Psychiatric:         Behavior: Behavior normal.         Current Outpatient Medications   Medication Sig    loratadine (Claritin) 10 mg tablet Take 10 mg by mouth. naproxen sodium (NAPROSYN) 220 mg tablet Take 220 mg by mouth as needed. multivitamin, stress formula (STRESS TAB) tablet Take 1 Tab by mouth daily. omega-3 fatty acids-vitamin e 1,000 mg cap Take 1 Cap by mouth.    levocetirizine (XYZAL) 5 mg tablet Take 5 mg by mouth. (Patient not taking: Reported on 1/11/2023)    ibuprofen (MOTRIN) 200 mg tablet Take  by mouth. (Patient not taking: Reported on 1/11/2023)     No current facility-administered medications for this visit. Past Medical History:   Diagnosis Date    Ankle sprain 04/2016    Left Lateral Sprain      Past Surgical History:   Procedure Laterality Date    HX HERNIA REPAIR      bilateral    ME ARTHRS AIDED ANT CRUCIATE LIGM RPR/AGMNTJ/RCNSTJ      Right revision- Saeed Loyd      Social History     Tobacco Use    Smoking status: Never    Smokeless tobacco: Never   Substance Use Topics    Alcohol use:  Yes     Alcohol/week: 8.3 standard drinks     Types: 10 Cans of beer per week     Comment: Social       Family History   Problem Relation Age of Onset    Cancer Mother         lung    Arthritis-rheumatoid Mother     Arthritis-rheumatoid Sister     COPD Father     Diabetes Neg Hx     Hypertension Neg Hx     Heart Disease Neg Hx Elevated Lipids Neg Hx     Thyroid Disease Neg Hx         Allergies   Allergen Reactions    Codeine Other (comments)     Had halluanations, but no real allergy        Assessment/Plan  Diagnoses and all orders for this visit:    1. Cough, unspecified type -symptoms consistent with an upper respiratory infection. Will place on Augmentin for 7 to 10 days.  -     AMB POC SARS-COV-2  -     AMB POC RAPID INFLUENZA TEST    2. Sinusitis, unspecified chronicity, unspecified location  -     amoxicillin-clavulanate (AUGMENTIN) 875-125 mg per tablet; Take 1 Tablet by mouth every twelve (12) hours for 10 days. Jeanmarie Linn MD  1/11/2023    This note was created with the help of speech recognition software Elías Ware) and may contain some 'sound alike' errors.

## 2023-01-20 ENCOUNTER — TELEPHONE (OUTPATIENT)
Dept: INTERNAL MEDICINE CLINIC | Age: 58
End: 2023-01-20

## 2023-01-20 NOTE — TELEPHONE ENCOUNTER
01/20/2023--I called and spoke with the patient and he stated that he will be here 7:45 am on Monday and was appreciative of the appointment.

## 2023-01-20 NOTE — TELEPHONE ENCOUNTER
Patient is calling to see if he can be worked in Monday for an appointment for an ongoing sinus infection. Patient does not have to see patient's that day and is available Monday for an appointment. Please advise.

## 2023-01-23 ENCOUNTER — OFFICE VISIT (OUTPATIENT)
Dept: INTERNAL MEDICINE CLINIC | Age: 58
End: 2023-01-23
Payer: COMMERCIAL

## 2023-01-23 VITALS
HEART RATE: 61 BPM | TEMPERATURE: 97.7 F | OXYGEN SATURATION: 100 % | BODY MASS INDEX: 24.36 KG/M2 | WEIGHT: 189.8 LBS | DIASTOLIC BLOOD PRESSURE: 75 MMHG | HEIGHT: 74 IN | SYSTOLIC BLOOD PRESSURE: 133 MMHG | RESPIRATION RATE: 14 BRPM

## 2023-01-23 DIAGNOSIS — J32.9 SINUSITIS, UNSPECIFIED CHRONICITY, UNSPECIFIED LOCATION: Primary | ICD-10-CM

## 2023-01-23 PROCEDURE — 99213 OFFICE O/P EST LOW 20 MIN: CPT | Performed by: INTERNAL MEDICINE

## 2023-01-23 RX ORDER — PREDNISONE 20 MG/1
40 TABLET ORAL
Qty: 12 TABLET | Refills: 0 | Status: SHIPPED | OUTPATIENT
Start: 2023-01-23 | End: 2023-01-29

## 2023-01-23 NOTE — PROGRESS NOTES
Nataliia Dubose  Identified pt with two pt identifiers(name and ). Chief Complaint   Patient presents with    Follow-up     RM18// pt presenting today with ongoing sinus pressure, post nasal drip, and cough       1. Have you been to the ER, urgent care clinic since your last visit? Hospitalized since your last visit? NO    2. Have you seen or consulted any other health care providers outside of the [address] [place] [address] since your last visit? Include any pap smears or colon screening. NO      Provider notified of reason for visit, vitals and flowsheets obtained on patients.      Patient received paperwork for advance directive during previous visit but has not completed at this time     Reviewed record In preparation for visit, huddled with provider and have obtained necessary documentation      Health Maintenance Due   Topic    Shingles Vaccine (1 of 2)    Colorectal Cancer Screening Combo     COVID-19 Vaccine (3 - Booster for Moderna series)    Flu Vaccine (1)       Wt Readings from Last 3 Encounters:   23 189 lb 12.8 oz (86.1 kg)   23 188 lb (85.3 kg)   21 183 lb (83 kg)     Temp Readings from Last 3 Encounters:   23 97.7 °F (36.5 °C) (Oral)   23 97.6 °F (36.4 °C) (Oral)   21 97.9 °F (36.6 °C) (Oral)     BP Readings from Last 3 Encounters:   23 133/75   23 123/76   21 122/73     Pulse Readings from Last 3 Encounters:   23 61   23 71   21 65     Vitals:    23 0743   BP: 133/75   Pulse: 61   Resp: 14   Temp: 97.7 °F (36.5 °C)   TempSrc: Oral   SpO2: 100%   Weight: 189 lb 12.8 oz (86.1 kg)   Height: 6' 2\" (1.88 m)   PainSc:   1   PainLoc: Head         Learning Assessment:  :     Learning Assessment 2015   PRIMARY LEARNER Patient Patient   HIGHEST LEVEL OF EDUCATION - PRIMARY LEARNER  - > 4 YEARS OF COLLEGE   BARRIERS PRIMARY LEARNER - NONE   CO-LEARNER CAREGIVER - No   PRIMARY LANGUAGE ENGLISH ENGLISH   LEARNER PREFERENCE PRIMARY - DEMONSTRATION   ANSWERED BY patient self   RELATIONSHIP SELF SELF       Depression Screening:  :     3 most recent PHQ Screens 1/23/2023   Little interest or pleasure in doing things Several days   Feeling down, depressed, irritable, or hopeless Several days   Total Score PHQ 2 2   Trouble falling or staying asleep, or sleeping too much -   Feeling tired or having little energy -   Poor appetite, weight loss, or overeating -   Feeling bad about yourself - or that you are a failure or have let yourself or your family down -   Trouble concentrating on things such as school, work, reading, or watching TV -   Moving or speaking so slowly that other people could have noticed; or the opposite being so fidgety that others notice -   Thoughts of being better off dead, or hurting yourself in some way -   PHQ 9 Score -   How difficult have these problems made it for you to do your work, take care of your home and get along with others -       Fall Risk Assessment:  :     Fall Risk Assessment, last 12 mths 3/24/2021   Able to walk? Yes   Fall in past 12 months? 0   Do you feel unsteady? 0   Are you worried about falling 0       Abuse Screening:  :     Abuse Screening Questionnaire 3/24/2021 10/16/2020 3/30/2020 11/21/2019 3/18/2019 12/21/2017   Do you ever feel afraid of your partner? N N N N N N   Are you in a relationship with someone who physically or mentally threatens you? N N N N N N   Is it safe for you to go home? Y Y Y Y Y Y       ADL Screening:  :     No flowsheet data found. Medication reconciliation up to date and corrected with patient at this time.

## 2023-01-23 NOTE — PROGRESS NOTES
Safia Whipple is a 60 y.o. male who presents today for Follow-up (RM18// pt presenting today with ongoing sinus pressure, post nasal drip, and cough)  . He has a history of   Patient Active Problem List   Diagnosis Code    AR (allergic rhinitis) J30.9    Adenomatous colon polyp D12.6   . Today patient is here for follow up on an acute concern. URI: Initially seen on 11 January due to upper respiratory symptoms and concern over sinus infection. He was prescribed Augmentin. Patient reports that his symptoms have overall not getting better. Improvements include fatigue. Patient reports that nothing has worsened. No change in PND. No actual fevers but sometimes feels slightly febrile. ROS  Review of Systems   Constitutional:  Negative for chills, fever and weight loss. HENT:  Positive for congestion and sinus pain. Negative for sore throat. PND   Eyes:  Negative for blurred vision, double vision and photophobia. Respiratory:  Positive for cough. Negative for shortness of breath. Cardiovascular:  Negative for chest pain, palpitations and leg swelling. Gastrointestinal:  Negative for abdominal pain, constipation, diarrhea, heartburn, nausea and vomiting. Genitourinary:  Negative for dysuria, frequency and urgency. Musculoskeletal:  Negative for joint pain and myalgias. Skin:  Negative for rash. Neurological: Negative. Negative for headaches. Endo/Heme/Allergies:  Does not bruise/bleed easily. Psychiatric/Behavioral:  Negative for memory loss and suicidal ideas. Visit Vitals  /75 (BP 1 Location: Left upper arm, BP Patient Position: Sitting, BP Cuff Size: Large adult)   Pulse 61   Temp 97.7 °F (36.5 °C) (Oral)   Resp 14   Ht 6' 2\" (1.88 m)   Wt 189 lb 12.8 oz (86.1 kg)   SpO2 100%   BMI 24.37 kg/m²       Physical Exam  Constitutional:       Appearance: He is well-developed. He is not diaphoretic. HENT:      Head: Normocephalic and atraumatic.       Ears: Comments: Cerumen to B canals. Nose: Nose normal.      Mouth/Throat:      Comments: Erythematous posterior over oropharynx  Eyes:      Pupils: Pupils are equal, round, and reactive to light. Cardiovascular:      Rate and Rhythm: Normal rate and regular rhythm. Heart sounds: No murmur heard. Pulmonary:      Effort: Pulmonary effort is normal. No respiratory distress. Breath sounds: Normal breath sounds. Comments: Good air movement. Normal breath sounds. Musculoskeletal:         General: Normal range of motion. Skin:     General: Skin is warm and dry. Neurological:      Mental Status: He is alert and oriented to person, place, and time. Psychiatric:         Behavior: Behavior normal.         Current Outpatient Medications   Medication Sig    loratadine (CLARITIN) 10 mg tablet Take 10 mg by mouth.    guaiFENesin ER (MUCINEX) 600 mg ER tablet Take 600 mg by mouth two (2) times a day. naproxen sodium (NAPROSYN) 220 mg tablet Take 220 mg by mouth as needed. multivitamin, stress formula (STRESS TAB) tablet Take 1 Tab by mouth daily. omega-3 fatty acids-vitamin e 1,000 mg cap Take 1 Cap by mouth. No current facility-administered medications for this visit. Past Medical History:   Diagnosis Date    Ankle sprain 04/2016    Left Lateral Sprain      Past Surgical History:   Procedure Laterality Date    HX HERNIA REPAIR      bilateral    MA ARTHRS AIDED ANT CRUCIATE LIGM RPR/AGMNTJ/RCNSTJ      Right revision- Danne Prow      Social History     Tobacco Use    Smoking status: Never    Smokeless tobacco: Never   Substance Use Topics    Alcohol use:  Yes     Alcohol/week: 8.3 standard drinks     Types: 10 Cans of beer per week     Comment: Social       Family History   Problem Relation Age of Onset    Cancer Mother         lung    Arthritis-rheumatoid Mother     Arthritis-rheumatoid Sister     COPD Father     Diabetes Neg Hx     Hypertension Neg Hx     Heart Disease Neg Hx Elevated Lipids Neg Hx     Thyroid Disease Neg Hx         Allergies   Allergen Reactions    Codeine Other (comments)     Had halluanations, but no real allergy        Assessment/Plan  Diagnoses and all orders for this visit:    1. Sinusitis, unspecified chronicity, unspecified location-slight improvement in infectious type symptoms. Will place on 6-day course of prednisone. Low threshold to change to Levaquin if symptoms persist by the end of the week. He will let us know  -     predniSONE (DELTASONE) 20 mg tablet; Take 2 Tablets by mouth daily (with breakfast) for 6 days. Michelle Medina MD  1/23/2023    This note was created with the help of speech recognition software Kamlesh Woodard) and may contain some 'sound alike' errors.

## 2023-05-08 RX ORDER — LORATADINE 10 MG/1
10 TABLET ORAL DAILY
COMMUNITY
End: 2023-05-10

## 2023-05-08 RX ORDER — GUAIFENESIN 600 MG/1
600 TABLET, EXTENDED RELEASE ORAL 2 TIMES DAILY
COMMUNITY
End: 2023-05-10

## 2023-05-10 ENCOUNTER — OFFICE VISIT (OUTPATIENT)
Age: 58
End: 2023-05-10
Payer: COMMERCIAL

## 2023-05-10 VITALS
HEART RATE: 75 BPM | BODY MASS INDEX: 23.46 KG/M2 | HEIGHT: 74 IN | DIASTOLIC BLOOD PRESSURE: 86 MMHG | RESPIRATION RATE: 16 BRPM | OXYGEN SATURATION: 98 % | WEIGHT: 182.8 LBS | SYSTOLIC BLOOD PRESSURE: 131 MMHG | TEMPERATURE: 98 F

## 2023-05-10 DIAGNOSIS — R97.20 ELEVATED PROSTATE SPECIFIC ANTIGEN (PSA): ICD-10-CM

## 2023-05-10 DIAGNOSIS — Z00.00 WELLNESS EXAMINATION: Primary | ICD-10-CM

## 2023-05-10 DIAGNOSIS — E78.5 HYPERLIPIDEMIA, UNSPECIFIED HYPERLIPIDEMIA TYPE: ICD-10-CM

## 2023-05-10 DIAGNOSIS — Z00.00 WELLNESS EXAMINATION: ICD-10-CM

## 2023-05-10 PROCEDURE — 99396 PREV VISIT EST AGE 40-64: CPT | Performed by: INTERNAL MEDICINE

## 2023-05-10 ASSESSMENT — ENCOUNTER SYMPTOMS
SHORTNESS OF BREATH: 0
SORE THROAT: 0
FACIAL SWELLING: 0
BACK PAIN: 0
EYE PAIN: 0
WHEEZING: 0
CONSTIPATION: 0
DIARRHEA: 0
ABDOMINAL PAIN: 0
COLOR CHANGE: 0
EYE ITCHING: 0
EYE DISCHARGE: 0

## 2023-05-10 NOTE — PROGRESS NOTES
Chief Complaint   Patient presents with    Annual Exam     Resp 16   Ht 6' 2\" (1.88 m)   Wt 182 lb 12.8 oz (82.9 kg)   BMI 23.47 kg/m²     1. Have you been to the ER, urgent care clinic since your last visit? Hospitalized since your last visit? No    2. Have you seen or consulted any other health care providers outside of the [address] [place] [address] since your last visit? Include any pap smears or colon screening.  No
220 mg by mouth as needed    OMEGA-3 FATTY ACIDS-VITAMIN E PO Take 1,000 mg by mouth daily     No current facility-administered medications for this visit. Past Medical History:   Diagnosis Date    Ankle sprain 04/2016    Left Lateral Sprain      Past Surgical History:   Procedure Laterality Date    HERNIA REPAIR      bilateral    KNEE SCOPE,AID ANT CRUCIATE REPAIR      Right revision- Merly Brenner      Social History     Tobacco Use    Smoking status: Never    Smokeless tobacco: Never   Substance Use Topics    Alcohol use: Yes     Alcohol/week: 8.3 standard drinks      Family History   Problem Relation Age of Onset    COPD Father     Rheum Arthritis Sister     Rheum Arthritis Mother     Diabetes Neg Hx     Thyroid Disease Neg Hx     Heart Disease Neg Hx     Hypertension Neg Hx     Elevated Lipids Neg Hx     Cancer Mother         lung        Allergies   Allergen Reactions    Codeine Other (See Comments)     Had halluanations, but no real allergy        Assessment/Plan  Cassie Dominguez was seen today for annual exam.    Diagnoses and all orders for this visit:    Wellness examination-his colorectal cancer screening results. Tawny Heimlich was counseled on age-appropriate/ guideline-based risk prevention behaviors and screening for a 60y.o. year old   male . We also discussed adjustments in screening based on family history if necessary. Printed instructions for preventative screening guidelines and healthy behaviors given to patient with after visit summary.    -     Cancel: Lipid Panel; Future  -     Cancel: Comprehensive Metabolic Panel; Future  -     Cancel: CBC with Auto Differential; Future  -     CBC with Auto Differential; Future  -     Comprehensive Metabolic Panel; Future  -     Lipid Panel; Future    Elevated prostate specific antigen (PSA)-reassuring MRI along with decreasing PSA with urology.   Follow-up late summer    Hyperlipidemia, unspecified hyperlipidemia type-mild in the past.  Repeat  -

## 2023-05-11 LAB
ALBUMIN SERPL-MCNC: 4.6 G/DL (ref 3.8–4.9)
ALBUMIN/GLOB SERPL: 2 {RATIO} (ref 1.2–2.2)
ALP SERPL-CCNC: 80 IU/L (ref 44–121)
ALT SERPL-CCNC: 20 IU/L (ref 0–44)
AST SERPL-CCNC: 21 IU/L (ref 0–40)
BASOPHILS # BLD AUTO: 0.1 X10E3/UL (ref 0–0.2)
BASOPHILS NFR BLD AUTO: 2 %
BILIRUB SERPL-MCNC: 1.1 MG/DL (ref 0–1.2)
BUN SERPL-MCNC: 13 MG/DL (ref 6–24)
BUN/CREAT SERPL: 16 (ref 9–20)
CALCIUM SERPL-MCNC: 9.9 MG/DL (ref 8.7–10.2)
CHLORIDE SERPL-SCNC: 104 MMOL/L (ref 96–106)
CHOLEST SERPL-MCNC: 182 MG/DL (ref 100–199)
CO2 SERPL-SCNC: 25 MMOL/L (ref 20–29)
CREAT SERPL-MCNC: 0.81 MG/DL (ref 0.76–1.27)
EGFRCR SERPLBLD CKD-EPI 2021: 102 ML/MIN/1.73
EOSINOPHIL # BLD AUTO: 0.2 X10E3/UL (ref 0–0.4)
EOSINOPHIL NFR BLD AUTO: 6 %
ERYTHROCYTE [DISTWIDTH] IN BLOOD BY AUTOMATED COUNT: 12.9 % (ref 11.6–15.4)
GLOBULIN SER CALC-MCNC: 2.3 G/DL (ref 1.5–4.5)
GLUCOSE SERPL-MCNC: 94 MG/DL (ref 70–99)
HCT VFR BLD AUTO: 44.6 % (ref 37.5–51)
HDLC SERPL-MCNC: 77 MG/DL
HGB BLD-MCNC: 15 G/DL (ref 13–17.7)
IMM GRANULOCYTES # BLD AUTO: 0 X10E3/UL (ref 0–0.1)
IMM GRANULOCYTES NFR BLD AUTO: 0 %
IMP & REVIEW OF LAB RESULTS: NORMAL
LDLC SERPL CALC-MCNC: 95 MG/DL (ref 0–99)
LYMPHOCYTES # BLD AUTO: 1.1 X10E3/UL (ref 0.7–3.1)
LYMPHOCYTES NFR BLD AUTO: 26 %
MCH RBC QN AUTO: 30.7 PG (ref 26.6–33)
MCHC RBC AUTO-ENTMCNC: 33.6 G/DL (ref 31.5–35.7)
MCV RBC AUTO: 91 FL (ref 79–97)
MONOCYTES # BLD AUTO: 0.4 X10E3/UL (ref 0.1–0.9)
MONOCYTES NFR BLD AUTO: 9 %
NEUTROPHILS # BLD AUTO: 2.4 X10E3/UL (ref 1.4–7)
NEUTROPHILS NFR BLD AUTO: 57 %
PLATELET # BLD AUTO: 306 X10E3/UL (ref 150–450)
POTASSIUM SERPL-SCNC: 4.8 MMOL/L (ref 3.5–5.2)
PROT SERPL-MCNC: 6.9 G/DL (ref 6–8.5)
RBC # BLD AUTO: 4.88 X10E6/UL (ref 4.14–5.8)
SODIUM SERPL-SCNC: 141 MMOL/L (ref 134–144)
TRIGL SERPL-MCNC: 48 MG/DL (ref 0–149)
VLDLC SERPL CALC-MCNC: 10 MG/DL (ref 5–40)
WBC # BLD AUTO: 4.3 X10E3/UL (ref 3.4–10.8)

## 2024-02-07 ENCOUNTER — TELEPHONE (OUTPATIENT)
Age: 59
End: 2024-02-07

## 2024-05-30 ENCOUNTER — TELEPHONE (OUTPATIENT)
Age: 59
End: 2024-05-30

## 2024-05-30 DIAGNOSIS — Z00.00 WELLNESS EXAMINATION: Primary | ICD-10-CM

## 2024-05-30 DIAGNOSIS — E78.5 HYPERLIPIDEMIA, UNSPECIFIED HYPERLIPIDEMIA TYPE: ICD-10-CM

## 2024-05-30 NOTE — TELEPHONE ENCOUNTER
Patient needs have his appt for 7/1 (physical appt) moved up to sometime in June. His insurance is going to end in June. He wanted to me reach out to see if you guys could fit him in somewhere. Please give him a call if you can fit him in sometime in June.

## 2024-05-30 NOTE — TELEPHONE ENCOUNTER
PSR attempted to reach out to patient to reschedule appointment for end of June - no answer, left detailed VM for call back

## 2024-05-30 NOTE — TELEPHONE ENCOUNTER
Patient returning call to schedule in June for CPE - wants to have labs done before hand   Patient will come  when they are available

## 2024-05-31 NOTE — TELEPHONE ENCOUNTER
05/31/2024--I called and spoke with the patient and let him know that I would leave the lab orders up front for . He verbalized understanding and stated that he would probably pick them up one day next week. Had no question's or concerns at that time.

## 2024-06-04 LAB
ALBUMIN SERPL-MCNC: 4.4 G/DL (ref 3.8–4.9)
ALBUMIN/GLOB SERPL: 1.8 {RATIO} (ref 1.2–2.2)
ALP SERPL-CCNC: 77 IU/L (ref 44–121)
ALT SERPL-CCNC: 16 IU/L (ref 0–44)
AST SERPL-CCNC: 24 IU/L (ref 0–40)
BASOPHILS # BLD AUTO: 0.1 X10E3/UL (ref 0–0.2)
BASOPHILS NFR BLD AUTO: 2 %
BILIRUB SERPL-MCNC: 1.2 MG/DL (ref 0–1.2)
BUN SERPL-MCNC: 12 MG/DL (ref 8–27)
BUN/CREAT SERPL: 15 (ref 10–24)
CALCIUM SERPL-MCNC: 9.6 MG/DL (ref 8.6–10.2)
CHLORIDE SERPL-SCNC: 103 MMOL/L (ref 96–106)
CHOLEST SERPL-MCNC: 175 MG/DL (ref 100–199)
CO2 SERPL-SCNC: 22 MMOL/L (ref 20–29)
CREAT SERPL-MCNC: 0.82 MG/DL (ref 0.76–1.27)
EGFRCR SERPLBLD CKD-EPI 2021: 101 ML/MIN/1.73
EOSINOPHIL # BLD AUTO: 0.3 X10E3/UL (ref 0–0.4)
EOSINOPHIL NFR BLD AUTO: 6 %
ERYTHROCYTE [DISTWIDTH] IN BLOOD BY AUTOMATED COUNT: 12.4 % (ref 11.6–15.4)
GLOBULIN SER CALC-MCNC: 2.4 G/DL (ref 1.5–4.5)
GLUCOSE SERPL-MCNC: 96 MG/DL (ref 70–99)
HCT VFR BLD AUTO: 42 % (ref 37.5–51)
HDLC SERPL-MCNC: 78 MG/DL
HGB BLD-MCNC: 14.4 G/DL (ref 13–17.7)
IMM GRANULOCYTES # BLD AUTO: 0 X10E3/UL (ref 0–0.1)
IMM GRANULOCYTES NFR BLD AUTO: 0 %
IMP & REVIEW OF LAB RESULTS: NORMAL
LDLC SERPL CALC-MCNC: 84 MG/DL (ref 0–99)
LYMPHOCYTES # BLD AUTO: 1 X10E3/UL (ref 0.7–3.1)
LYMPHOCYTES NFR BLD AUTO: 23 %
MCH RBC QN AUTO: 31.2 PG (ref 26.6–33)
MCHC RBC AUTO-ENTMCNC: 34.3 G/DL (ref 31.5–35.7)
MCV RBC AUTO: 91 FL (ref 79–97)
MONOCYTES # BLD AUTO: 0.6 X10E3/UL (ref 0.1–0.9)
MONOCYTES NFR BLD AUTO: 12 %
NEUTROPHILS # BLD AUTO: 2.6 X10E3/UL (ref 1.4–7)
NEUTROPHILS NFR BLD AUTO: 57 %
PLATELET # BLD AUTO: 322 X10E3/UL (ref 150–450)
POTASSIUM SERPL-SCNC: 5 MMOL/L (ref 3.5–5.2)
PROT SERPL-MCNC: 6.8 G/DL (ref 6–8.5)
RBC # BLD AUTO: 4.61 X10E6/UL (ref 4.14–5.8)
SODIUM SERPL-SCNC: 138 MMOL/L (ref 134–144)
TRIGL SERPL-MCNC: 69 MG/DL (ref 0–149)
VLDLC SERPL CALC-MCNC: 13 MG/DL (ref 5–40)
WBC # BLD AUTO: 4.6 X10E3/UL (ref 3.4–10.8)

## 2024-06-25 ENCOUNTER — OFFICE VISIT (OUTPATIENT)
Age: 59
End: 2024-06-25
Payer: COMMERCIAL

## 2024-06-25 VITALS
WEIGHT: 186 LBS | TEMPERATURE: 98.6 F | HEIGHT: 74 IN | BODY MASS INDEX: 23.87 KG/M2 | HEART RATE: 67 BPM | OXYGEN SATURATION: 97 % | RESPIRATION RATE: 16 BRPM | DIASTOLIC BLOOD PRESSURE: 68 MMHG | SYSTOLIC BLOOD PRESSURE: 118 MMHG

## 2024-06-25 DIAGNOSIS — Z00.00 WELLNESS EXAMINATION: Primary | ICD-10-CM

## 2024-06-25 DIAGNOSIS — M25.562 CHRONIC PAIN OF LEFT KNEE: ICD-10-CM

## 2024-06-25 DIAGNOSIS — R97.20 ELEVATED PROSTATE SPECIFIC ANTIGEN (PSA): ICD-10-CM

## 2024-06-25 DIAGNOSIS — G89.29 CHRONIC PAIN OF LEFT KNEE: ICD-10-CM

## 2024-06-25 DIAGNOSIS — J30.9 ALLERGIC RHINITIS, UNSPECIFIED SEASONALITY, UNSPECIFIED TRIGGER: ICD-10-CM

## 2024-06-25 PROCEDURE — 99396 PREV VISIT EST AGE 40-64: CPT | Performed by: INTERNAL MEDICINE

## 2024-06-25 RX ORDER — MELOXICAM 15 MG/1
15 TABLET ORAL DAILY
Qty: 90 TABLET | Refills: 1 | Status: SHIPPED | OUTPATIENT
Start: 2024-06-25

## 2024-06-25 RX ORDER — MELOXICAM 15 MG/1
15 TABLET ORAL DAILY
COMMUNITY
Start: 2024-06-01 | End: 2024-06-25 | Stop reason: SDUPTHER

## 2024-06-25 SDOH — ECONOMIC STABILITY: FOOD INSECURITY: WITHIN THE PAST 12 MONTHS, YOU WORRIED THAT YOUR FOOD WOULD RUN OUT BEFORE YOU GOT MONEY TO BUY MORE.: NEVER TRUE

## 2024-06-25 SDOH — ECONOMIC STABILITY: HOUSING INSECURITY
IN THE LAST 12 MONTHS, WAS THERE A TIME WHEN YOU DID NOT HAVE A STEADY PLACE TO SLEEP OR SLEPT IN A SHELTER (INCLUDING NOW)?: NO

## 2024-06-25 SDOH — ECONOMIC STABILITY: INCOME INSECURITY: HOW HARD IS IT FOR YOU TO PAY FOR THE VERY BASICS LIKE FOOD, HOUSING, MEDICAL CARE, AND HEATING?: NOT HARD AT ALL

## 2024-06-25 SDOH — ECONOMIC STABILITY: FOOD INSECURITY: WITHIN THE PAST 12 MONTHS, THE FOOD YOU BOUGHT JUST DIDN'T LAST AND YOU DIDN'T HAVE MONEY TO GET MORE.: NEVER TRUE

## 2024-06-25 ASSESSMENT — ENCOUNTER SYMPTOMS
EYE DISCHARGE: 0
SORE THROAT: 0
CONSTIPATION: 0
BACK PAIN: 0
COLOR CHANGE: 0
DIARRHEA: 0
EYE PAIN: 0
FACIAL SWELLING: 0
SHORTNESS OF BREATH: 0
EYE ITCHING: 0
WHEEZING: 0
ABDOMINAL PAIN: 0

## 2024-06-25 ASSESSMENT — PATIENT HEALTH QUESTIONNAIRE - PHQ9
1. LITTLE INTEREST OR PLEASURE IN DOING THINGS: NOT AT ALL
SUM OF ALL RESPONSES TO PHQ QUESTIONS 1-9: 0
SUM OF ALL RESPONSES TO PHQ9 QUESTIONS 1 & 2: 0
SUM OF ALL RESPONSES TO PHQ QUESTIONS 1-9: 0
2. FEELING DOWN, DEPRESSED OR HOPELESS: NOT AT ALL
SUM OF ALL RESPONSES TO PHQ QUESTIONS 1-9: 0
SUM OF ALL RESPONSES TO PHQ QUESTIONS 1-9: 0

## 2024-06-25 NOTE — PROGRESS NOTES
Lance Hood is a 59 y.o. male who presents today for Annual Exam (Pt is nonfasting;)  .      He has a history of   Patient Active Problem List   Diagnosis    Adenomatous colon polyp    AR (allergic rhinitis)   .    Today patient is here for CPE.   Moving to [facility]. From that area.     Lipids stable this month.     Has a Complex tear of the medial meniscus. Monitoring.     Health maintenance hx includes:  Exercise: moderately active.  Form of exercise: limited.    Diet: generally follows a low fat low cholesterol diet.  Social: PT, currently working on his own group.                Lives at home with wife. Has 2 children. Both out of the house.      Cancer screening:    Colon cancer screening:  C-scope obtained last year showed last scope in 2015. Reports that he had one in 2020.  Either way will be due by next year.  He will get this done once established in [place].   Prostate cancer screening: Done with urology and trending down.  Reassuring MRI.  Last visit in the fall.  Due to be done in late fall.    Immunizations:     Immunization History   Administered Date(s) Administered    COVID-19, MODERNA BLUE border, Primary or Immunocompromised, (age 12y+), IM, 100 mcg/0.5mL 01/21/2021, 02/18/2021    COVID-19, MODERNA, (2023-24 formula), (age 12y+), IM, 50mcg/0.5mL 11/29/2023    Influenza Virus Vaccine 10/04/2010, 10/18/2011, 10/29/2012, 10/21/2013, 11/29/2017, 11/01/2018, 11/14/2018, 01/08/2024    Influenza, FLUARIX, FLULAVAL, FLUZONE (age 6 mo+) AND AFLURIA, (age 3 y+), PF, 0.5mL 11/14/2018    Influenza, FLUCELVAX, (age 6 mo+), MDCK, MDV, 0.5mL 10/28/2019, 09/28/2020    Influenza, Trivalent PF 09/29/2014    TDaP, ADACEL (age 10y-64y), BOOSTRIX (age 10y+), IM, 0.5mL 12/20/2016    Td vaccine (adult) 10/04/2008      Immunization status: Shingles vaccine due..      ROS  Review of Systems   Constitutional:  Negative for activity change, appetite change, fever and unexpected weight change.   HENT:  Negative for